# Patient Record
Sex: MALE | Employment: OTHER | URBAN - METROPOLITAN AREA
[De-identification: names, ages, dates, MRNs, and addresses within clinical notes are randomized per-mention and may not be internally consistent; named-entity substitution may affect disease eponyms.]

---

## 2018-02-03 ENCOUNTER — HOSPITAL ENCOUNTER (EMERGENCY)
Age: 83
Discharge: HOME | End: 2018-02-03
Attending: EMERGENCY MEDICINE
Payer: MEDICARE

## 2018-02-03 VITALS
RESPIRATION RATE: 16 BRPM | DIASTOLIC BLOOD PRESSURE: 65 MMHG | HEIGHT: 70 IN | HEART RATE: 58 BPM | SYSTOLIC BLOOD PRESSURE: 143 MMHG | TEMPERATURE: 97.5 F | BODY MASS INDEX: 21.62 KG/M2 | WEIGHT: 151 LBS | OXYGEN SATURATION: 96 %

## 2018-02-03 DIAGNOSIS — R79.1 ELEVATED INR (INTERNATIONAL NORMALIZED RATIO): ICD-10-CM

## 2018-02-03 DIAGNOSIS — S81.819A SKIN TEAR OF LOWER LEG WITHOUT COMPLICATION, INITIAL ENCOUNTER: Primary | ICD-10-CM

## 2018-02-03 LAB
INR PPP: 3.8 (ref 0.9–1.1)
PROTHROMBIN TIME: 37.8 SEC (ref 9–11.1)

## 2018-02-03 PROCEDURE — 99283 EMERGENCY DEPT VISIT LOW MDM: CPT

## 2018-02-03 PROCEDURE — 36415 COLL VENOUS BLD VENIPUNCTURE: CPT | Performed by: NURSE PRACTITIONER

## 2018-02-03 PROCEDURE — 85610 PROTHROMBIN TIME: CPT | Performed by: NURSE PRACTITIONER

## 2018-02-03 RX ORDER — RANITIDINE 300 MG/1
300 TABLET ORAL DAILY
COMMUNITY
End: 2018-08-10

## 2018-02-03 RX ORDER — PYRIDOXINE HCL (VITAMIN B6) 100 MG
100 TABLET ORAL DAILY
COMMUNITY

## 2018-02-03 RX ORDER — FUROSEMIDE 40 MG/1
80 TABLET ORAL DAILY
COMMUNITY

## 2018-02-03 RX ORDER — MENTHOL
1000 GEL (GRAM) TOPICAL DAILY
COMMUNITY

## 2018-02-03 NOTE — DISCHARGE INSTRUCTIONS
Cuts Left Open: Care Instructions  Your Care Instructions    A cut can happen anywhere on your body. Sometimes a cut can injure the tendons, blood vessels, or nerves. A cut may be left open instead of being closed with stitches, staples, or adhesive. A cut may be left open when it is likely to become infected, because closing it can make infection even more likely. You will probably have a bandage. The doctor may want the cut to stay open the whole time it heals. This happens with some cuts when too much time has gone by since the cut happened. Or the doctor may tell you to come back to have the cut closed in 4 to 5 days, when there is less chance of infection. If the cut stays open while healing, your scar may be larger than if the cut was closed. But you can get treatment later to make the scar smaller. The doctor has checked you carefully, but problems can develop later. If you notice any problems or new symptoms, get medical treatment right away. Follow-up care is a key part of your treatment and safety. Be sure to make and go to all appointments, and call your doctor if you are having problems. It's also a good idea to know your test results and keep a list of the medicines you take. How can you care for yourself at home? · Keep the cut dry for the first 24 to 48 hours. After this, you can shower if your doctor okays it. Pat the cut dry. · Don't soak the cut, such as in a bathtub. Your doctor will tell you when it's safe to get the cut wet. · If your doctor told you how to care for your cut, follow your doctor's instructions. If you did not get instructions, follow this general advice:  ¨ After the first 24 to 48 hours, wash the cut with clean water 2 times a day. Don't use hydrogen peroxide or alcohol, which can slow healing. ¨ You may cover the cut with a thin layer of petroleum jelly, such as Vaseline, and a nonstick bandage.   ¨ Apply more petroleum jelly and replace the bandage as needed. · Prop up the injured area on a pillow anytime you sit or lie down during the next 3 days. Try to keep it above the level of your heart. This will help reduce swelling. · Avoid any activity that could cause your cut to get worse. · Take pain medicines exactly as directed. ¨ If the doctor gave you a prescription medicine for pain, take it as prescribed. ¨ If you are not taking a prescription pain medicine, ask your doctor if you can take an over-the-counter medicine. When should you call for help? Call your doctor now or seek immediate medical care if:  ? · You have new pain, or your pain gets worse. ? · The cut starts to bleed, and blood soaks through the bandage. Oozing small amounts of blood is normal.   ? · The skin near the cut is cold or pale or changes color. ? · You have tingling, weakness, or numbness near the cut.   ? · You have trouble moving the area near the cut.   ? · You have symptoms of infection, such as:  ¨ Increased pain, swelling, warmth, or redness around the cut. ¨ Red streaks leading from the cut. ¨ Pus draining from the cut. ¨ A fever. ? Watch closely for changes in your health, and be sure to contact your doctor if:  ? · The cut is not closing (getting smaller). ? · You do not get better as expected. Where can you learn more? Go to http://gwyn-rich.info/. Enter [id number] in the search box to learn more about \"Cuts Left Open: Care Instructions. \"  Current as of: March 20, 2017  Content Version: 11.4  © 6688-4421 [employer]. Care instructions adapted under license by [employer] (which disclaims liability or warranty for this information). If you have questions about a medical condition or this instruction, always ask your healthcare professional. [place] [address] any warranty or liability for your use of this information.

## 2018-02-03 NOTE — ED TRIAGE NOTES
Patient ambulated to ED treatment area with personal cane for complaint if \"my right leg started to bleed last night. It stopped and I went to bed. When I got out of the shower this morning another place started to bleed. \" Patient denies injury to the area.

## 2018-02-03 NOTE — ED PROVIDER NOTES
HPI Comments: The patient is a 80 y.o. male with past medical history significant for Hairy Cell Leukemia, colon cancer, aortic valve murmur, iron deficiency anemia and GERD who presents from home with chief complaint of bleeding on his right leg. Patient states that he had a spot on his leg yesterday that opened up. He placed a dressing over the area. He did use an adhesive bandage to the skin. This morning when he got out of the shower he noticed his leg was bleeding in 2 additional areas. All atraumatic. He was recently seen at his oncologist and states his platelets were normal. He forgot to bring his paperwork with him. His INR has been running near 3.0 over the last moth. He is due to have that checked in the next few days. Patient denies: fevers, chills, nausea, vomiting, diarrhea, constipation, loss of bowel or bladder, chest pain, shortness of breath or dyspnea on exertion. Nothing makes the symptoms better or worse. There are no other acute medical concerns at this time. PCP: Flavio Arreguin MD      The history is provided by the patient. No  was used. Past Medical History:   Diagnosis Date    Arrhythmia     A-Fib.  Arthritis     Cancer ([address]     luekemia- just completed 8 days of chemotherapy    [facility] [address] [address]     Hairy cell leukemia    Cancer ([address]     colon    GERD (gastroesophageal reflux disease)     Murmur     Other ill-defined conditions(799.89)     De Leon's esophagus    Unspecified sleep apnea     doesn't use c-pap.        Past Surgical History:   Procedure Laterality Date    ABDOMEN SURGERY PROC UNLISTED      colon resection    COLONOSCOPY N/A 6/22/2016    COLONOSCOPY performed by Cabrera Loera MD at [facility]    HX ORTHOPAEDIC  2002    broken right leg -repair     HX OTHER SURGICAL      hemmrhoidectomy    HX TONSILLECTOMY           Family History:   Problem Relation Age of Onset    Cancer Mother     Cancer Brother        Social History     Social History    Marital status:      Spouse name: N/A    Number of children: N/A    Years of education: N/A     Occupational History    Not on file. Social History Main Topics    Smoking status: Former Smoker     Packs/day: 2.00     Quit date: 11/8/1980    Smokeless tobacco: Never Used    Alcohol use 0.5 oz/week     1 Glasses of wine per week      Comment: glass with dinner nightly    Drug use: No    Sexual activity: Not on file     Other Topics Concern    Not on file     Social History Narrative         ALLERGIES: Review of patient's allergies indicates no known allergies. Review of Systems   Constitutional: Negative for appetite change, chills and fever. HENT: Negative. Respiratory: Negative for cough, shortness of breath and wheezing. Cardiovascular: Negative for chest pain. Gastrointestinal: Negative for abdominal pain, constipation, diarrhea, nausea and vomiting. Genitourinary: Negative for dysuria and urgency. Musculoskeletal: Negative for back pain. Skin: Positive for wound. Negative for color change and rash. Neurological: Negative for dizziness and headaches. Psychiatric/Behavioral: Negative. All other systems reviewed and are negative. Vitals:    02/03/18 1133   BP: 165/79   Pulse: 86   Resp: 17   Temp: 97.5 °F (36.4 °C)   SpO2: 100%   Weight: 151 kg (332 lb 14.3 oz)   Height: 5' 10\" (1.778 m)            Physical Exam   Constitutional: He is oriented to person, place, and time. He appears well-developed and well-nourished. HENT:   Head: Normocephalic and atraumatic. Neck: Normal range of motion. Neck supple. Cardiovascular: Normal rate, regular rhythm and intact distal pulses. Murmur heard. Systolic murmur is present with a grade of 5/6   Pulses:       Radial pulses are 2+ on the right side, and 2+ on the left side. Dorsalis pedis pulses are 2+ on the right side, and 2+ on the left side. Pulmonary/Chest: Effort normal and breath sounds normal. No respiratory distress. He has no decreased breath sounds. He has no wheezes. He has no rales. He exhibits no tenderness. Abdominal: Soft. Bowel sounds are normal. There is no tenderness. There is no guarding. Musculoskeletal: Normal range of motion. Neurological: He is alert and oriented to person, place, and time. Skin: Skin is warm and dry. Laceration and petechiae noted. No rash noted. No erythema. Psychiatric: He has a normal mood and affect. His behavior is normal. Judgment and thought content normal.   Nursing note and vitals reviewed. MDM  Number of Diagnoses or Management Options        ED Course     Assessment & Plan:   PT/INR    Consult to [facility]. Dr. Chidi Cunningham office for lab verification    Discussed with DO Dominick Buitrago NP  02/03/18  12:00 PM    Procedures    Spoke to the on-call RN at [facility]. She confirmed his labs as: WBC: 6.1; Hgb: 10.3; PLT: 223. They do not have an INR on record. Will dress the wound on his leg with adaptic non-adherent dressing, 4x4's and ACE wrap. Dominick Sellers NP  02/03/18  12:07 PM    INR is 3.8. Advised patient to contact the physician that adjusts his coumadin doses today for assistance in bringing down his INR to an appropriate level. 12:56 PM  The patient has been reevaluated. The patient is ready for discharge. The patient's signs, symptoms, diagnosis, and discharge instructions have been discussed and the patient/ family has conveyed their understanding. The patient is to follow up as recommended or return to the ED should their symptoms worsen. Plan has been discussed and the patient is in agreement.     LABORATORY TESTS:  Recent Results (from the past 12 hour(s))   PROTHROMBIN TIME + INR    Collection Time: 02/03/18 12:07 PM   Result Value Ref Range    INR 3.8 (H) 0.9 - 1.1      Prothrombin time 37.8 (H) 9.0 - 11.1 sec       IMAGING RESULTS:  No orders to display     No results found. MEDICATIONS GIVEN:  Medications - No data to display    IMPRESSION:  1. Skin tear of lower leg without complication, initial encounter    2. Elevated INR (international normalized ratio)        PLAN:  1. Discharge Medication List as of 2/3/2018 12:53 PM        2. Follow-up Information     Follow up With Details Comments Contact Info    Nik López MD  for INR re-check [place] [address]  [phone]      [address] [place], [place] [address]T  As needed, If symptoms worsen [address] [place] [address]  [phone]        3.  Discussed return precautions    Return to ED for new or worsening symptoms       Rashmi Fitzgerald NP

## 2018-02-03 NOTE — ED NOTES
Patient  discharged with instructions per NP [facility] Selena BABCOCK. Instructions reviewed with pt.

## 2018-08-10 ENCOUNTER — HOSPITAL ENCOUNTER (EMERGENCY)
Age: 83
Discharge: HOME | End: 2018-08-10
Attending: EMERGENCY MEDICINE | Admitting: EMERGENCY MEDICINE
Payer: MEDICARE

## 2018-08-10 VITALS
HEART RATE: 67 BPM | RESPIRATION RATE: 16 BRPM | OXYGEN SATURATION: 95 % | WEIGHT: 150 LBS | BODY MASS INDEX: 21.52 KG/M2 | SYSTOLIC BLOOD PRESSURE: 144 MMHG | DIASTOLIC BLOOD PRESSURE: 83 MMHG

## 2018-08-10 DIAGNOSIS — Z79.01 ANTICOAGULANT LONG-TERM USE: ICD-10-CM

## 2018-08-10 DIAGNOSIS — S51.812A LACERATION OF LEFT FOREARM, INITIAL ENCOUNTER: Primary | ICD-10-CM

## 2018-08-10 PROCEDURE — 77030039266 HC ADH SKN EXOFIN S2SG -A

## 2018-08-10 PROCEDURE — 77030018390 HC SPNG HEMSTAT2 J&J -B

## 2018-08-10 PROCEDURE — 99282 EMERGENCY DEPT VISIT SF MDM: CPT

## 2018-08-10 PROCEDURE — 77030018836 HC SOL IRR NACL ICUM -A

## 2018-08-10 PROCEDURE — 75810000293 HC SIMP/SUPERF WND  RPR

## 2018-08-10 RX ORDER — OMEPRAZOLE 20 MG/1
20 CAPSULE, DELAYED RELEASE ORAL DAILY
COMMUNITY
End: 2018-10-14

## 2018-08-10 NOTE — DISCHARGE INSTRUCTIONS
Cuts Closed With Adhesives: Care Instructions  Your Care Instructions  A cut can happen anywhere on your body. The doctor used an adhesive to close the cut. When the adhesive dries, it forms a film that holds the edges of the cut together. Skin adhesives are sometimes called liquid stitches. If the cut went deep and through the skin, the doctor may have put in a layer of stitches below the adhesive. The deeper layer of stitches brings the deep part of the cut together. These stitches will dissolve and don't need to be removed. You don't see the stitches, only the adhesive. You may have a bandage. The doctor has checked you carefully, but problems can develop later. If you notice any problems or new symptoms, get medical treatment right away. Follow-up care is a key part of your treatment and safety. Be sure to make and go to all appointments, and call your doctor if you are having problems. It's also a good idea to know your test results and keep a list of the medicines you take. How can you care for yourself at home? · Keep the cut dry for the first 24 to 48 hours. After this, you can shower if your doctor okays it. Pat the cut dry. · Don't soak the cut, such as in a bathtub. Your doctor will tell you when it's safe to get the cut wet. · If your doctor told you how to care for your cut, follow your doctor's instructions. If you did not get instructions, follow this general advice:  ¨ Do not put any kind of ointment, cream, or lotion over the area. This can make the adhesive fall off too soon. ¨ After the first 24 to 48 hours, wash around the cut with clean water 2 times a day. Do not use hydrogen peroxide or alcohol, which can slow healing. ¨ If the doctor told you to use a bandage, put on a new bandage after cleaning the cut or if the bandage gets wet or dirty. · Prop up the sore area on a pillow anytime you sit or lie down during the next 3 days. Try to keep it above the level of your heart. This will help reduce swelling. · Leave the skin adhesive on your skin until it falls off on its own. This may take 5 to 10 days. · Do not scratch, rub, or pick at the adhesive. · Do not put the sticky part of a bandage directly on the adhesive. · Avoid any activity that could cause your cut to reopen. · Be safe with medicines. Read and follow all instructions on the label. ¨ If the doctor gave you a prescription medicine for pain, take it as prescribed. ¨ If you are not taking a prescription pain medicine, ask your doctor if you can take an over-the-counter medicine. When should you call for help? Call your doctor now or seek immediate medical care if:    · You have new pain, or your pain gets worse.     · The skin near the cut is cold or pale or changes color.     · You have tingling, weakness, or numbness near the cut.     · The cut starts to bleed.     · You have trouble moving the area near the cut.     · You have symptoms of infection, such as:  ¨ Increased pain, swelling, warmth, or redness around the cut. ¨ Red streaks leading from the cut. ¨ Pus draining from the cut. ¨ A fever.    Watch closely for changes in your health, and be sure to contact your doctor if:    · The cut reopens.     · You do not get better as expected. Where can you learn more? Go to http://gwyn-rich.info/. Enter P174 in the search box to learn more about \"Cuts Closed With Adhesives: Care Instructions. \"  Current as of: November 20, 2017  Content Version: 11.7  © 9120-8450 [employer]. Care instructions adapted under license by [employer] (which disclaims liability or warranty for this information). If you have questions about a medical condition or this instruction, always ask your healthcare professional. [address] any warranty or liability for your use of this information.          Cuts: Care Instructions  Your Care Instructions  A cut can happen anywhere on your body. Stitches, staples, skin adhesives, or pieces of tape called Steri-Strips are sometimes used to keep the edges of a cut together and help it heal. Steri-Strips can be used by themselves or with stitches or staples. Sometimes cuts are left open. If the cut went deep and through the skin, the doctor may have closed the cut in two layers. A deeper layer of stitches brings the deep part of the cut together. These stitches will dissolve and don't need to be removed. The upper layer closure, which could be stitches, staples, Steri-Strips, or adhesive, is what you see on the cut. A cut is often covered by a bandage. The doctor has checked you carefully, but problems can develop later. If you notice any problems or new symptoms, get medical treatment right away. Follow-up care is a key part of your treatment and safety. Be sure to make and go to all appointments, and call your doctor if you are having problems. It's also a good idea to know your test results and keep a list of the medicines you take. How can you care for yourself at home? If a cut is open or closed  · Prop up the sore area on a pillow anytime you sit or lie down during the next 3 days. Try to keep it above the level of your heart. This will help reduce swelling. · Keep the cut dry for the first 24 to 48 hours. After this, you can shower if your doctor okays it. Pat the cut dry. · Don't soak the cut, such as in a bathtub. Your doctor will tell you when it's safe to get the cut wet. · After the first 24 to 48 hours, clean the cut with soap and water 2 times a day unless your doctor gives you different instructions. ¨ Don't use hydrogen peroxide or alcohol, which can slow healing. ¨ You may cover the cut with a thin layer of petroleum jelly and a nonstick bandage. ¨ If the doctor put a bandage over the cut, put on a new bandage after cleaning the cut or if the bandage gets wet or dirty.   · Avoid any activity that could cause your cut to reopen. · Be safe with medicines. Read and follow all instructions on the label. ¨ If the doctor gave you a prescription medicine for pain, take it as prescribed. ¨ If you are not taking a prescription pain medicine, ask your doctor if you can take an over-the-counter medicine. If the cut is closed with stitches, staples, or Steri-Strips  · Follow the above instructions for open or closed cuts. · Do not remove the stitches or staples on your own. Your doctor will tell you when to come back to have the stitches or staples removed. · Leave Steri-Strips on until they fall off. If the cut is closed with a skin adhesive  · Follow the above instructions for open or closed cuts. · Leave the skin adhesive on your skin until it falls off on its own. This may take 5 to 10 days. · Do not scratch, rub, or pick at the adhesive. · Do not put the sticky part of a bandage directly on the adhesive. · Do not put any kind of ointment, cream, or lotion over the area. This can make the adhesive fall off too soon. Do not use hydrogen peroxide or alcohol, which can slow healing. When should you call for help? Call your doctor now or seek immediate medical care if:    · You have new pain, or your pain gets worse.     · The skin near the cut is cold or pale or changes color.     · You have tingling, weakness, or numbness near the cut.     · The cut starts to bleed, and blood soaks through the bandage. Oozing small amounts of blood is normal.     · You have trouble moving the area near the cut.     · You have symptoms of infection, such as:  ¨ Increased pain, swelling, warmth, or redness around the cut. ¨ Red streaks leading from the cut. ¨ Pus draining from the cut. ¨ A fever.    Watch closely for changes in your health, and be sure to contact your doctor if:    · The cut reopens.     · You do not get better as expected. Where can you learn more? Go to http://gwyn-rich.info/.   Enter Z730 in the search box to learn more about \"Cuts: Care Instructions. \"  Current as of: November 20, 2017  Content Version: 11.7  © 4534-5308 [employer]. Care instructions adapted under license by [employer] (which disclaims liability or warranty for this information). If you have questions about a medical condition or this instruction, always ask your healthcare professional. [address] any warranty or liability for your use of this information.

## 2018-08-10 NOTE — ED PROVIDER NOTES
HPI Comments: 59-year-old white male presents to the emergency department with a skin tear. The patient states that he has very thin skin. He is also on Coumadin for atrial fibrillation. He states that 5 days ago he scraped his left forearm on a door. He has a 2 cm laceration to the dorsum of the left forearm. Patient states that the area initially stopped bleeding after about a day. He then states over the last 24 hours the area has been oozing a little bit of blood. Patient had his INR checked 3 days ago and it was 2.5 which is normal. Patient denies pain in his left arm. He denies fevers. No other injuries. Patient denies dizziness, chest pain or shortness of breath. The history is provided by the patient. Past Medical History:   Diagnosis Date    Arrhythmia     A-Fib. 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 Past Surgical History:   Procedure Laterality Date    ABDOMEN SURGERY PROC UNLISTED      colon resection    COLONOSCOPY N/A 6/22/2016    COLONOSCOPY performed by Rachell Grace MD at [address] HX ORTHOPAEDIC  2002    broken right leg -repair     HX OTHER SURGICAL      hemmrhoidectomy    HX TONSILLECTOMY           Family History:   Problem Relation Age of Onset    Cancer Mother     Cancer Brother        Social History     Social History    Marital status:      Spouse name: N/A    Number of children: N/A    Years of education: N/A     Occupational History    Not on file.      Social History Main Topics    Smoking status: Former Smoker     Packs/day: 2.00     Quit date: 11/8/1980    Smokeless tobacco: Never Used    Alcohol use 0.5 oz/week     1 Glasses of wine per week      Comment: glass with dinner nightly    Drug use: No    Sexual activity: Not on file     Other Topics Concern    Not on file     Social History Narrative         ALLERGIES: Review of patient's allergies indicates no known allergies. Review of Systems   Constitutional: Negative for fever. HENT: Negative for congestion. Eyes: Negative for pain. Respiratory: Negative for shortness of breath. Cardiovascular: Negative for chest pain. Gastrointestinal: Negative for abdominal pain. Endocrine: Negative for polyuria. Genitourinary: Negative for difficulty urinating. Musculoskeletal: Negative for neck pain. Skin: Positive for wound. Allergic/Immunologic: Negative for immunocompromised state. Neurological: Negative for headaches. Hematological: Does not bruise/bleed easily. Psychiatric/Behavioral: Negative for confusion. All other systems reviewed and are negative. There were no vitals filed for this visit. Physical Exam   Constitutional: He is oriented to person, place, and time. He appears well-developed and well-nourished. No distress. HENT:   Head: Normocephalic and atraumatic. Right Ear: External ear normal.   Left Ear: External ear normal.   Eyes: EOM are normal. Pupils are equal, round, and reactive to light. Neck: Normal range of motion. Neck supple. No JVD present. No tracheal deviation present. Cardiovascular: Normal rate, regular rhythm and normal heart sounds. Exam reveals no gallop and no friction rub. No murmur heard. Pulmonary/Chest: Effort normal and breath sounds normal. No stridor. No respiratory distress. He has no wheezes. He has no rales. Abdominal: Soft. Bowel sounds are normal. He exhibits no distension. There is no tenderness. There is no rebound and no guarding. Musculoskeletal: Normal range of motion. He exhibits no edema, tenderness or deformity. Neurological: He is alert and oriented to person, place, and time.  He has normal reflexes. No cranial nerve deficit. Coordination normal.   Skin: Skin is warm and dry. No rash noted. He is not diaphoretic. No erythema. 2 cm linear superficial laceration to left forearm. No real bleeding from skin tear/laceration. Psychiatric: He has a normal mood and affect. His behavior is normal. Judgment and thought content normal.   Nursing note and vitals reviewed. MDM  Number of Diagnoses or Management Options  Diagnosis management comments: Patient has a 2 cm skin tear to the left forearm. We'll apply Dermabond. Will apply a good dressing. Patient declines having his INR rechecked as he says he just had it checked several days ago. Amount and/or Complexity of Data Reviewed  Decide to obtain previous medical records or to obtain history from someone other than the patient: yes  Review and summarize past medical records: yes  Independent visualization of images, tracings, or specimens: yes          ED Course       Wound Closure by Adhesive  Date/Time: 8/10/2018 11:15 AM  Performed by: Travis Stallings  Authorized by: Clari CASTANEDA     Consent:     Consent obtained:  Verbal    Consent given by:  Patient    Risks discussed:  Infection, pain and retained foreign body    Alternatives discussed:  No treatment  Anesthesia (see MAR for exact dosages):      Anesthesia method:  None  Laceration details:     Location:  Shoulder/arm    Shoulder/arm location:  L lower arm    Length (cm):  2  Repair type:     Repair type:  Simple  Pre-procedure details:     Preparation:  Patient was prepped and draped in usual sterile fashion  Treatment:     Area cleansed with:  Saline    Amount of cleaning:  Standard    Irrigation solution:  Sterile water    Irrigation method:  Syringe    Visualized foreign bodies/material removed: no    Skin repair:     Repair method:  Tissue adhesive  Approximation:     Approximation:  Close    Vermilion border: well-aligned    Post-procedure details:     Dressing:  Non-adherent dressing    Patient tolerance of procedure: Tolerated well, no immediate complications      Dressing placed over Dermabond w/ good relief    No active bleeding now after dressing has been placed    Good return precautions given to patient. Close follow up with PCP recommended. Patient and/or family voices understanding of this plan. Discharge instructions were explained by me and all concerns were addressed.

## 2018-08-10 NOTE — ED TRIAGE NOTES
Pt ambulated to the treatment area with a steady gait using his cane. Pt states \"I bumped my left arm on a door about a week ago I have a skin tare I feel it needs to be looked at. \"

## 2018-08-10 NOTE — ED NOTES
Pt was discharged and given instructions by Dr Tiffanie Means . Pt verbalized good understanding of all discharge instructions, and F/U care. All questions answered. Pt in stable condition on discharge.

## 2018-10-14 ENCOUNTER — APPOINTMENT (OUTPATIENT)
Dept: CT IMAGING | Age: 83
End: 2018-10-14
Attending: EMERGENCY MEDICINE
Payer: MEDICARE

## 2018-10-14 ENCOUNTER — APPOINTMENT (OUTPATIENT)
Dept: GENERAL RADIOLOGY | Age: 83
End: 2018-10-14
Attending: EMERGENCY MEDICINE
Payer: MEDICARE

## 2018-10-14 ENCOUNTER — APPOINTMENT (OUTPATIENT)
Dept: MRI IMAGING | Age: 83
End: 2018-10-14
Attending: INTERNAL MEDICINE
Payer: MEDICARE

## 2018-10-14 ENCOUNTER — HOSPITAL ENCOUNTER (OUTPATIENT)
Age: 83
Setting detail: OBSERVATION
Discharge: HOME | End: 2018-10-15
Attending: EMERGENCY MEDICINE | Admitting: INTERNAL MEDICINE
Payer: MEDICARE

## 2018-10-14 DIAGNOSIS — Z79.01 CHRONIC ANTICOAGULATION: ICD-10-CM

## 2018-10-14 DIAGNOSIS — G45.9 TIA (TRANSIENT ISCHEMIC ATTACK): Primary | ICD-10-CM

## 2018-10-14 DIAGNOSIS — I48.20 CHRONIC ATRIAL FIBRILLATION (HCC): Chronic | ICD-10-CM

## 2018-10-14 PROBLEM — R29.898 HAND WEAKNESS: Status: ACTIVE | Noted: 2018-10-14

## 2018-10-14 PROBLEM — R93.89 ABNORMAL CHEST X-RAY: Status: ACTIVE | Noted: 2018-10-14

## 2018-10-14 PROBLEM — N18.30 CKD (CHRONIC KIDNEY DISEASE) STAGE 3, GFR 30-59 ML/MIN (HCC): Chronic | Status: ACTIVE | Noted: 2018-10-14

## 2018-10-14 PROBLEM — R77.8 ELEVATED TROPONIN: Status: ACTIVE | Noted: 2018-10-14

## 2018-10-14 PROBLEM — G47.33 OSA (OBSTRUCTIVE SLEEP APNEA): Chronic | Status: ACTIVE | Noted: 2018-10-14

## 2018-10-14 LAB
ALBUMIN SERPL-MCNC: 3 G/DL (ref 3.5–5)
ALBUMIN/GLOB SERPL: 0.8 {RATIO} (ref 1.1–2.2)
ALP SERPL-CCNC: 175 U/L (ref 45–117)
ALT SERPL-CCNC: 25 U/L (ref 12–78)
ANION GAP SERPL CALC-SCNC: 5 MMOL/L (ref 5–15)
APTT PPP: 32.8 SEC (ref 22.1–32)
AST SERPL-CCNC: 24 U/L (ref 15–37)
BASOPHILS # BLD: 0.1 K/UL (ref 0–0.1)
BASOPHILS NFR BLD: 1 % (ref 0–1)
BILIRUB SERPL-MCNC: 0.8 MG/DL (ref 0.2–1)
BNP SERPL-MCNC: ABNORMAL PG/ML (ref 0–450)
BUN SERPL-MCNC: 29 MG/DL (ref 6–20)
BUN/CREAT SERPL: 16 (ref 12–20)
CALCIUM SERPL-MCNC: 8.8 MG/DL (ref 8.5–10.1)
CHLORIDE SERPL-SCNC: 104 MMOL/L (ref 97–108)
CO2 SERPL-SCNC: 32 MMOL/L (ref 21–32)
CREAT SERPL-MCNC: 1.79 MG/DL (ref 0.7–1.3)
DIFFERENTIAL METHOD BLD: ABNORMAL
EOSINOPHIL # BLD: 0.5 K/UL (ref 0–0.4)
EOSINOPHIL NFR BLD: 7 % (ref 0–7)
ERYTHROCYTE [DISTWIDTH] IN BLOOD BY AUTOMATED COUNT: 17 % (ref 11.5–14.5)
GLOBULIN SER CALC-MCNC: 3.8 G/DL (ref 2–4)
GLUCOSE SERPL-MCNC: 84 MG/DL (ref 65–100)
HCT VFR BLD AUTO: 33.1 % (ref 36.6–50.3)
HGB BLD-MCNC: 10.4 G/DL (ref 12.1–17)
IMM GRANULOCYTES # BLD: 0.1 K/UL (ref 0–0.04)
IMM GRANULOCYTES NFR BLD AUTO: 1 % (ref 0–0.5)
INR PPP: 1.3 (ref 0.9–1.1)
LYMPHOCYTES # BLD: 1.9 K/UL (ref 0.8–3.5)
LYMPHOCYTES NFR BLD: 25 % (ref 12–49)
MCH RBC QN AUTO: 33 PG (ref 26–34)
MCHC RBC AUTO-ENTMCNC: 31.4 G/DL (ref 30–36.5)
MCV RBC AUTO: 105.1 FL (ref 80–99)
MONOCYTES # BLD: 1.1 K/UL (ref 0–1)
MONOCYTES NFR BLD: 15 % (ref 5–13)
NEUTS SEG # BLD: 3.7 K/UL (ref 1.8–8)
NEUTS SEG NFR BLD: 51 % (ref 32–75)
NRBC # BLD: 0.18 K/UL (ref 0–0.01)
NRBC BLD-RTO: 2.4 PER 100 WBC
PLATELET # BLD AUTO: 297 K/UL (ref 150–400)
PMV BLD AUTO: 9.4 FL (ref 8.9–12.9)
POTASSIUM SERPL-SCNC: 4 MMOL/L (ref 3.5–5.1)
PROT SERPL-MCNC: 6.8 G/DL (ref 6.4–8.2)
PROTHROMBIN TIME: 13.4 SEC (ref 9–11.1)
RBC # BLD AUTO: 3.15 M/UL (ref 4.1–5.7)
RBC MORPH BLD: ABNORMAL
SODIUM SERPL-SCNC: 141 MMOL/L (ref 136–145)
THERAPEUTIC RANGE,PTTT: ABNORMAL SECS (ref 58–77)
TROPONIN I SERPL-MCNC: 0.09 NG/ML
TROPONIN I SERPL-MCNC: 0.09 NG/ML
WBC # BLD AUTO: 7.4 K/UL (ref 4.1–11.1)

## 2018-10-14 PROCEDURE — 80053 COMPREHEN METABOLIC PANEL: CPT | Performed by: EMERGENCY MEDICINE

## 2018-10-14 PROCEDURE — 85025 COMPLETE CBC W/AUTO DIFF WBC: CPT | Performed by: EMERGENCY MEDICINE

## 2018-10-14 PROCEDURE — 83880 ASSAY OF NATRIURETIC PEPTIDE: CPT | Performed by: EMERGENCY MEDICINE

## 2018-10-14 PROCEDURE — 85610 PROTHROMBIN TIME: CPT | Performed by: EMERGENCY MEDICINE

## 2018-10-14 PROCEDURE — 74011250636 HC RX REV CODE- 250/636: Performed by: INTERNAL MEDICINE

## 2018-10-14 PROCEDURE — 71045 X-RAY EXAM CHEST 1 VIEW: CPT

## 2018-10-14 PROCEDURE — 70450 CT HEAD/BRAIN W/O DYE: CPT

## 2018-10-14 PROCEDURE — 74011250637 HC RX REV CODE- 250/637: Performed by: INTERNAL MEDICINE

## 2018-10-14 PROCEDURE — 85730 THROMBOPLASTIN TIME PARTIAL: CPT | Performed by: EMERGENCY MEDICINE

## 2018-10-14 PROCEDURE — 65660000000 HC RM CCU STEPDOWN

## 2018-10-14 PROCEDURE — 96374 THER/PROPH/DIAG INJ IV PUSH: CPT

## 2018-10-14 PROCEDURE — 77030032490 HC SLV COMPR SCD KNE COVD -B

## 2018-10-14 PROCEDURE — 65390000012 HC CONDITION CODE 44 OBSERVATION

## 2018-10-14 PROCEDURE — 84484 ASSAY OF TROPONIN QUANT: CPT | Performed by: EMERGENCY MEDICINE

## 2018-10-14 PROCEDURE — 96376 TX/PRO/DX INJ SAME DRUG ADON: CPT

## 2018-10-14 PROCEDURE — 93005 ELECTROCARDIOGRAM TRACING: CPT

## 2018-10-14 PROCEDURE — 36415 COLL VENOUS BLD VENIPUNCTURE: CPT | Performed by: INTERNAL MEDICINE

## 2018-10-14 PROCEDURE — 99285 EMERGENCY DEPT VISIT HI MDM: CPT

## 2018-10-14 RX ORDER — SODIUM CHLORIDE 9 MG/ML
75 INJECTION, SOLUTION INTRAVENOUS CONTINUOUS
Status: CANCELLED | OUTPATIENT
Start: 2018-10-14 | End: 2018-10-15

## 2018-10-14 RX ORDER — ENOXAPARIN SODIUM 100 MG/ML
1 INJECTION SUBCUTANEOUS EVERY 12 HOURS
Status: DISCONTINUED | OUTPATIENT
Start: 2018-10-14 | End: 2018-10-14

## 2018-10-14 RX ORDER — OXYCODONE AND ACETAMINOPHEN 5; 325 MG/1; MG/1
1 TABLET ORAL
Status: DISCONTINUED | OUTPATIENT
Start: 2018-10-14 | End: 2018-10-15 | Stop reason: HOSPADM

## 2018-10-14 RX ORDER — ZOLPIDEM TARTRATE 5 MG/1
5 TABLET ORAL
Status: DISCONTINUED | OUTPATIENT
Start: 2018-10-14 | End: 2018-10-15 | Stop reason: HOSPADM

## 2018-10-14 RX ORDER — SODIUM CHLORIDE 0.9 % (FLUSH) 0.9 %
5-10 SYRINGE (ML) INJECTION EVERY 8 HOURS
Status: DISCONTINUED | OUTPATIENT
Start: 2018-10-14 | End: 2018-10-15 | Stop reason: HOSPADM

## 2018-10-14 RX ORDER — PROCHLORPERAZINE EDISYLATE 5 MG/ML
10 INJECTION INTRAMUSCULAR; INTRAVENOUS
Status: DISCONTINUED | OUTPATIENT
Start: 2018-10-14 | End: 2018-10-15 | Stop reason: HOSPADM

## 2018-10-14 RX ORDER — LOPERAMIDE HYDROCHLORIDE 2 MG/1
2 CAPSULE ORAL DAILY
COMMUNITY

## 2018-10-14 RX ORDER — ACETAMINOPHEN 325 MG/1
650 TABLET ORAL
Status: DISCONTINUED | OUTPATIENT
Start: 2018-10-14 | End: 2018-10-15 | Stop reason: HOSPADM

## 2018-10-14 RX ORDER — MONTELUKAST SODIUM 10 MG/1
10 TABLET ORAL
Status: DISCONTINUED | OUTPATIENT
Start: 2018-10-14 | End: 2018-10-15 | Stop reason: HOSPADM

## 2018-10-14 RX ORDER — FAMOTIDINE 20 MG/1
20 TABLET, FILM COATED ORAL DAILY
Status: DISCONTINUED | OUTPATIENT
Start: 2018-10-15 | End: 2018-10-15 | Stop reason: HOSPADM

## 2018-10-14 RX ORDER — ENOXAPARIN SODIUM 100 MG/ML
40 INJECTION SUBCUTANEOUS EVERY 24 HOURS
Status: CANCELLED | OUTPATIENT
Start: 2018-10-14

## 2018-10-14 RX ORDER — LABETALOL HYDROCHLORIDE 5 MG/ML
10 INJECTION, SOLUTION INTRAVENOUS
Status: DISCONTINUED | OUTPATIENT
Start: 2018-10-14 | End: 2018-10-15 | Stop reason: HOSPADM

## 2018-10-14 RX ORDER — LOPERAMIDE HYDROCHLORIDE 2 MG/1
2 CAPSULE ORAL DAILY
Status: DISCONTINUED | OUTPATIENT
Start: 2018-10-15 | End: 2018-10-15 | Stop reason: HOSPADM

## 2018-10-14 RX ORDER — LANOLIN ALCOHOL/MO/W.PET/CERES
325 CREAM (GRAM) TOPICAL 2 TIMES DAILY
COMMUNITY

## 2018-10-14 RX ORDER — ENOXAPARIN SODIUM 100 MG/ML
60 INJECTION SUBCUTANEOUS EVERY 24 HOURS
Status: DISCONTINUED | OUTPATIENT
Start: 2018-10-14 | End: 2018-10-15 | Stop reason: HOSPADM

## 2018-10-14 RX ORDER — CHOLESTYRAMINE 4 G/9G
1 POWDER, FOR SUSPENSION ORAL
COMMUNITY

## 2018-10-14 RX ORDER — RANITIDINE 150 MG/1
150 TABLET, FILM COATED ORAL 2 TIMES DAILY
COMMUNITY

## 2018-10-14 RX ORDER — CHOLESTYRAMINE 4 G/4.8G
1 POWDER, FOR SUSPENSION ORAL
Status: DISCONTINUED | OUTPATIENT
Start: 2018-10-14 | End: 2018-10-15 | Stop reason: HOSPADM

## 2018-10-14 RX ORDER — CHOLESTYRAMINE 4 G/4.8G
4 POWDER, FOR SUSPENSION ORAL 2 TIMES DAILY
COMMUNITY
End: 2018-10-14

## 2018-10-14 RX ORDER — HYDROMORPHONE HYDROCHLORIDE 2 MG/ML
0.5 INJECTION, SOLUTION INTRAMUSCULAR; INTRAVENOUS; SUBCUTANEOUS
Status: DISCONTINUED | OUTPATIENT
Start: 2018-10-14 | End: 2018-10-15 | Stop reason: HOSPADM

## 2018-10-14 RX ORDER — LANOLIN ALCOHOL/MO/W.PET/CERES
325 CREAM (GRAM) TOPICAL 2 TIMES DAILY
Status: DISCONTINUED | OUTPATIENT
Start: 2018-10-14 | End: 2018-10-15 | Stop reason: HOSPADM

## 2018-10-14 RX ORDER — SODIUM CHLORIDE 0.9 % (FLUSH) 0.9 %
5-10 SYRINGE (ML) INJECTION AS NEEDED
Status: DISCONTINUED | OUTPATIENT
Start: 2018-10-14 | End: 2018-10-15 | Stop reason: HOSPADM

## 2018-10-14 RX ADMIN — FERROUS SULFATE TAB 325 MG (65 MG ELEMENTAL FE) 325 MG: 325 (65 FE) TAB at 18:46

## 2018-10-14 RX ADMIN — Medication 10 ML: at 18:47

## 2018-10-14 RX ADMIN — WARFARIN SODIUM 2.5 MG: 2 TABLET ORAL at 17:52

## 2018-10-14 RX ADMIN — MONTELUKAST SODIUM 10 MG: 10 TABLET, COATED ORAL at 21:30

## 2018-10-14 RX ADMIN — ENOXAPARIN SODIUM 60 MG: 60 INJECTION SUBCUTANEOUS at 18:46

## 2018-10-14 RX ADMIN — Medication 10 ML: at 21:35

## 2018-10-14 RX ADMIN — CHOLESTYRAMINE 4 G: 4 POWDER, FOR SUSPENSION ORAL at 21:30

## 2018-10-14 NOTE — PROGRESS NOTES
Attempted MRI. Pt unable to lay flat for mri (89yrs old)(put pillows under bottom to tilt him back + apparatus to raise his head up--still couldnt lay head back) Pt refused when i offered to call nurse to get meds to help him relax. Informed RN when I returned him to floor.

## 2018-10-14 NOTE — PROGRESS NOTES
[facility] Dosing Services: 10/14/18 Consult for Warfarin Dosing by Pharmacy by Dr. Carlos Mcneal Consult provided for this 80 y.o.  male , for indication of Atrial Fibrillation. Day of Therapy: resuming from home, patient has been off warfarin since 10/6 due to suspected GI bleeding/blood in stool. Prior to holding therapy patient was on 7mg daily (5mg tab +2mg tab). Dose to achieve an INR goal of 2-3 Order entered for  Warfarin 2.5 (mg) ordered to be given today at 18:00. Will resume therapy cautiously due to history of bleed. Significant drug interactions: enoxaparin bridging Previous dose given PTA 10/5/18 7mg PT/INR Lab Results Component Value Date/Time INR 1.3 (H) 10/14/2018 02:37 PM  
  
Platelets Lab Results Component Value Date/Time PLATELET 604 32/50/6065 02:37 PM  
  
H/H Lab Results Component Value Date/Time HGB 10.4 (L) 10/14/2018 02:37 PM  
  
 
Pharmacy to follow daily and will provide subsequent Warfarin dosing based on clinical status. Werner Kaur)  Contact information [phone]

## 2018-10-14 NOTE — ED NOTES
Pt asked if he could use restroom, attempted to give urinal but pt refused. Pt ambulatory to restroom with cane at this time.

## 2018-10-14 NOTE — PROGRESS NOTES
Problem: Falls - Risk of 
Goal: *Absence of Falls Document Noe Azevedo Fall Risk and appropriate interventions in the flowsheet. Outcome: Progressing Towards Goal 
Fall Risk Interventions:

## 2018-10-14 NOTE — PROGRESS NOTES
Spiritual Care Assessment/Progress Note [address] 
 
 
NAME: Abi Mccarthy      MRN: [id number] AGE: 80 y.o. SEX: male Anabaptist Affiliation: Bahai  
Language: English  
 
10/14/2018     Total Time (in minutes): 5 Spiritual Assessment begun in [facility] EMERGENCY DEPT through conversation with: 
  
    []Patient        [] Family    [] Friend(s) Reason for Consult: Crisis, Emergency Department visit Spiritual beliefs: (Please include comment if needed) 
   [] Identifies with a amberly tradition:     
   [] Supported by a amberly community:        
   [] Claims no spiritual orientation:       
   [] Seeking spiritual identity:            
   [] Adheres to an individual form of spirituality:       
   [x] Not able to assess:                   
 
    
Identified resources for coping:  
   [] Prayer                           
   [] Music                  [] Guided Imagery 
   [] Family/friends                 [] Pet visits [] Devotional reading                         [] Unknown 
   [] Other:                                         
 
 
Interventions offered during this visit: (See comments for more details) Patient Interventions: Other (comment) Plan of Care: 
 
 [] Support spiritual and/or cultural needs  
 [] Support AMD and/or advance care planning process    
 [] Support grieving process 
 [] Coordinate Rites and/or Rituals  
 [] Coordination with community clergy [] No spiritual needs identified at this time 
 [] Detailed Plan of Care below (See Comments)  [] Make referral to Music Therapy 
[] Make referral to Pet Therapy    
[] Make referral to Addiction services 
[] Make referral to [facility] 
[] Make referral to Spiritual Care Partner 
[] No future visits requested       
[x] Follow up visits as needed Comments: [employer] is responding to Code S in ER 13. Pt is unavailable and heading to [place] at the time.  Spiritual care will continue to follow as able and as needed. Chaplain Yasir Kingston M.Div, M.S, [address] Spiritual Care available at [phone])

## 2018-10-14 NOTE — IP AVS SNAPSHOT
Haydee Steen 
 
 
 [address] [facility] 
[phone] Patient: Cherise Concepcion MRN: [id number] :10/6/1924 About your hospitalization You were admitted on:  2018 You last received care in the:  [facility] 3 PROG CARE TELE 1 You were discharged on:  October 15, 2018 Why you were hospitalized Your primary diagnosis was:  Hand Weakness Your diagnoses also included:  Colon Cancer (Hcc), Chronic Atrial Fibrillation (Hcc), Mathew (Obstructive Sleep Apnea), Chronic Anticoagulation, Elevated Troponin, Abnormal Chest X-Ray, Ckd (Chronic Kidney Disease) Stage 3, Gfr 30-59 Ml/Min (Hcc) Follow-up Information Follow up With Details Comments Contact Info Joanne Banerjee NP  call for follow up in stroke/TIA clinic with JAMEE Barillas. Bring your medicine bottles to your appointment [place] [address] [address] [facility] [address] 
[phone] Rafa Fitzpatrick MD Schedule an appointment as soon as possible for a visit on 10/16/2018 Hospital PCP f/u appointment on Tuesday 10/16 @ 11:30 a.m. For INR check. [address] [facility] [address] 1007 [facility] 
[phone] Helmut Severance, MD Schedule an appointment as soon as possible for a visit in 1 week for coumadin re evaluation [address] [facility] 1007 [facility] 
[phone] Your Scheduled Appointments Monday October 15, 2018  7:45 PM EDT  
MRA BRAIN WO CONT with [facility] MRI 1 [facility] MRI ([address]) [address] 100[address] [facility]  
[phone] 1. Please bring a list or a bag of your current medications to your appointment 2. Please be sure to remove ALL hair clips, pins, extensions, etc., prior to arriving for your MRI procedure. 3. If you have any medical implants or devices, please bring associated medical card with you.  4. Bring any non [facility] films or CDs pertaining to the area being imaged with you on the day of appointment. 5. A written order with a valid diagnosis and Physicians  signature is required for all scheduled tests. 6. Check in at registration 30min before your appointment time unless you were instructed to do otherwise. Park in designated visitor/patient parking. Enter through the main entrance, which is just to the left of the fountain. Once inside, go around the corner to the left. You will register in Outpatient Registration. Discharge Orders None A check boni indicates which time of day the medication should be taken. My Medications START taking these medications Instructions Each Dose to Equal  
 Morning Noon Evening Bedtime  
 warfarin 3 mg tablet Commonly known as:  COUMADIN Your last dose was: Your next dose is: Take 1 Tab by mouth daily for 7 doses. Indications: PREVENT THROMBOEMBOLISM IN CHRONIC ATRIAL FIBRILLATION  
 3 mg CONTINUE taking these medications Instructions Each Dose to Equal  
 Morning Noon Evening Bedtime  
 cholestyramine 4 gram packet Commonly known as:  Magdalene Altes Your last dose was: Your next dose is: Take 1 Packet by mouth nightly. 1 Packet  
    
   
   
   
  
 ferrous sulfate 325 mg (65 mg iron) tablet Your last dose was: Your next dose is: Take 325 mg by mouth two (2) times a day. 325 mg  
    
   
   
   
  
 LASIX 40 mg tablet Generic drug:  furosemide Your last dose was: Your next dose is: Take 80 mg by mouth daily. 80 mg  
    
   
   
   
  
 loperamide 2 mg capsule Commonly known as:  IMODIUM Your last dose was: Your next dose is: Take 2 mg by mouth daily. 2 mg PROBIOTIC 4X 10-15 mg Tbec Generic drug:  B.infantis-B.ani-B.long-B.bifi Your last dose was: Your next dose is: Take 1 Cap by mouth daily. 1 Cap  
    
   
   
   
  
 raNITIdine 150 mg tablet Commonly known as:  ZANTAC Your last dose was: Your next dose is: Take 150 mg by mouth two (2) times a day. 150 mg  
    
   
   
   
  
 SINGULAIR 10 mg tablet Generic drug:  montelukast  
   
Your last dose was: Your next dose is: Take 10 mg by mouth nightly. 10 mg  
    
   
   
   
  
 VITAMIN B-6 100 mg tablet Generic drug:  pyridoxine (vitamin B6) Your last dose was: Your next dose is: Take 100 mg by mouth daily. 100 mg  
    
   
   
   
  
 vitamin e 1,000 unit capsule Commonly known as:  E GEMS Your last dose was: Your next dose is: Take 1,000 Units by mouth daily. 1000 Units Where to Get Your Medications Information on where to get these meds will be given to you by the nurse or doctor. ! Ask your nurse or doctor about these medications  
  warfarin 3 mg tablet Discharge Instructions Patient Discharge Instructions Jhonathan Jennifer / [id number] : 10/6/1924 Admitted 10/14/2018 Discharged: 10/15/2018 Primary Diagnoses Problem List as of 10/15/2018  Date Reviewed: 10/15/2018 Codes Class Noted - Resolved * (Principal)Hand weakness Chronic atrial fibrillation (HCC) (Chronic) EDWIN (obstructive sleep apnea) (Chronic) Chronic anticoagulation Elevated troponin Abnormal chest x-ray CKD (chronic kidney disease) stage 3, GFR 30-59 ml/min (HCC) (Chronic) Colon cancer ([facility].) (Chronic) Take Home Medications · It is important that you take the medication exactly as they are prescribed.   
· Keep your medication in the bottles provided by the pharmacist and keep a list of the medication names, dosages, and times to be taken in your wallet. · Do not take other medications without consulting your doctor. What to do at [facility] Recommended diet: Cardiac Diet Recommended activity: Activity as tolerated If you experience worse symptoms, please follow up with Neurology. Follow-up with your PCP in a few weeks Information obtained by : 
I understand that if any problems occur once I am at home I am to contact my physician. I understand and acknowledge receipt of the instructions indicated above. Physician's or R.N.'s Signature                                                                  Date/Time Patient or Representative Signature                                                          Date/Time [employer] Announcement We are excited to announce that we are making your provider's discharge notes available to you in [employer]. You will see these notes when they are completed and signed by the physician that discharged you from your recent hospital stay. If you have any questions or concerns about any information you see in [employer], please call the Health Information Department where you were seen or reach out to your Primary Care Provider for more information about your plan of care. Introducing [facility]! Dear Naina Client: 
Thank you for requesting a [employer] account. Our records indicate that you already have an active [employer] account. You can access your account anytime at https://[facility]. [employer]/[facility] Did you know that you can access your hospital and ER discharge instructions at any time in [employer]?   You can also review all of your test results from your hospital stay or ER visit. Additional Information If you have questions, please visit the Frequently Asked Questions section of the [employer] website at [email or web address] Remember, [employer] is NOT to be used for urgent needs. For medical emergencies, dial 911. Now available from your iPhone and Android! Introducing Ruben Atwood As a Kristel Belem patient, I wanted to make you aware of our electronic visit tool called [employer]en Atwood. [employer] allows you to connect within minutes with a medical provider 24 hours a day, seven days a week via a mobile device or tablet or logging into a secure website from your computer. You can access Ruben Atwood from anywhere in the [place]. A virtual visit might be right for you when you have a simple condition and feel like you just dont want to get out of bed, or cant get away from work for an appointment, when your regular Kristel Patricia provider is not available (evenings, weekends or holidays), or when youre out of town and need minor care. Electronic visits cost only $49 and if the Kristel Patricia [employer] provider determines a prescription is needed to treat your condition, one can be electronically transmitted to a nearby pharmacy*. Please take a moment to enroll today if you have not already done so. The enrollment process is free and takes just a few minutes. To enroll, please download the [employer] samy to your tablet or phone, or visit [email or web address] to enroll on your computer. And, as an [address] [place] [address] patient with a [employer] account, the results of your visits will be scanned into your electronic medical record and your primary care provider will be able to view the scanned results. We urge you to continue to see your regular Kristel Patricia provider for your ongoing medical care.   And while your primary care provider may not be the one available when you seek a [employer] virtual visit, the peace of mind you get from getting a real diagnosis real time can be priceless. For more information on [employer], view our Frequently Asked Questions (FAQs) at [email or web address]. Sincerely, 
 
Twanna Lefort, MD 
Chief Medical Officer Rell Abreu *:  certain medications cannot be prescribed via [employer] Unresulted Labs-Please follow up with your PCP about these lab tests Order Current Status DUPLEX CAROTID BILATERAL Preliminary result Providers Seen During Your Hospitalization Provider Specialty Primary office phone Joseph Padilla MD Emergency Medicine [phone] Josefina Florentino MD Internal Medicine [phone] Immunizations Administered for This Admission Name Date Influenza Vaccine (Quad) PF  Deferred () Your Primary Care Physician (PCP) Primary Care Physician Office Phone Office Fax [place] [phone] You are allergic to the following No active allergies Recent Documentation Height Weight BMI Smoking Status 1.778 m 64.5 kg 20.42 kg/m2 Former Smoker Emergency Contacts Name Discharge Info Relation Home Work Mobile Indu Singh DISCHARGE CAREGIVER [3] Spouse [3] [phone] Patient Belongings The following personal items are in your possession at time of discharge: 
  Dental Appliances: None  Visual Aid: At bedside, Glasses      Home Medications: None   Jewelry: None  Clothing: None    Other Valuables: None  Personal Items Sent to Safe: NA Discharge Instructions Attachments/References ISCHEMIC STROKE: GENERAL INFO (ENGLISH) TIA (TRANSIENT ISCHEMIC ATTACK) (ENGLISH) ISCHEMIC STROKE MEDICINES: GENERAL INFO (ENGLISH) Patient Handouts Learning About an Ischemic Stroke What is an ischemic stroke? An ischemic (say \"psr-ZME-gojt\") stroke occurs when a blood clot blocks a blood vessel in the brain. This means that blood cannot flow to some part of the brain. Without blood and the oxygen it carries, this part of the brain starts to die. The part of the body controlled by the damaged area of the brain cannot work properly. This is different from a hemorrhagic (say \"ois-tck-YO-haylieck\") stroke, which happens when a blood vessel in the brain has burst open or has started to leak. The brain damage from a stroke starts within minutes. Quick treatment can help limit damage to the brain and make recovery more likely. People who have had a stroke may have a hard time talking, understanding things, and making decisions. They may have to relearn daily activities, such as how to eat, bathe, and dress. How well someone recovers from a stroke depends on how quickly the person gets to the hospital, where in the brain the stroke happened, and how severe it was. Training and therapy also make a difference in how well people recover. What are the symptoms? If you have any of these symptoms, call 911 or other emergency services right away: 
· You have symptoms of a stroke. These may include: 
¨ Sudden numbness, tingling, weakness, or loss of movement in your face, arm, or leg, especially on only one side of your body. ¨ Sudden vision changes. ¨ Sudden trouble speaking. ¨ Sudden confusion or trouble understanding simple statements. ¨ Sudden problems with walking or balance. ¨ A sudden, severe headache that is different from past headaches. See your doctor if you have symptoms that seem like a stroke, even if they go away quickly. You may have had a transient ischemic attack (TIA), sometimes called a mini-stroke. A TIA is a warning that a stroke may happen soon. Getting early treatment for a TIA can help prevent a stroke. What causes an ischemic stroke? An ischemic stroke is caused by a blood clot that blocks blood flow in the brain. The most common causes of blood clots include: 
· Hardening of the arteries (atherosclerosis). This is caused by high blood pressure, diabetes, high cholesterol, or smoking. · Atrial fibrillation. How is ischemic stroke treated? You may have to take several medicines, depending on what caused your stroke. Ask your doctor if a stroke rehab program is right for you. Rehab increases your chances of getting back some of the abilities you lost. 
How can you prevent another stroke? · Work with your doctor to treat any health problems you have. High blood pressure, high cholesterol, atrial fibrillation, and diabetes all raise your chances of having a stroke. · Be safe with medicines. Take your medicine exactly as prescribed. Call your doctor if you think you are having a problem with your medicine. · Have a healthy lifestyle. ¨ Do not smoke or allow others to smoke around you. If you need help quitting, talk to your doctor about stop-smoking programs and medicines. These can increase your chances of quitting for good. Smoking makes a stroke more likely. ¨ Limit alcohol to 2 drinks a day for men and 1 drink a day for women. ¨ Lose weight if you need to. A healthy weight will help you keep your heart and body healthy. ¨ Be active. Ask your doctor what type and level of activity is safe for you. ¨ Eat heart-healthy foods, like fruits, vegetables, and high-fiber foods. Follow-up care is a key part of your treatment and safety. Be sure to make and go to all appointments, and call your doctor if you are having problems. It's also a good idea to know your test results and keep a list of the medicines you take. Where can you learn more? Go to http://gwyn-rich.info/. Enter D161 in the search box to learn more about \"Learning About an Ischemic Stroke. \" Current as of: November 21, 2017 Content Version: 11.8 © 8273-4512 Che[employer]Ã¡. Care instructions adapted under license by [employer] (which disclaims liability or warranty for this information). If you have questions about a medical condition or this instruction, always ask your healthcare professional. [address] any warranty or liability for your use of this information. Transient Ischemic Attack: Care Instructions Your Care Instructions A transient ischemic attack (TIA) is when blood flow to a part of your brain is blocked for a short time. A TIA is like a stroke but usually lasts only a few minutes. A TIA does not cause lasting brain damage. Any vision problems, slurred speech, or other symptoms usually go away in 10 to 20 minutes. But they may last for up to 24 hours. TIAs are often warning signs of a stroke. Some people who have a TIA may have a stroke in the future. A stroke can cause symptoms like those of a TIA. But a stroke causes lasting damage to your brain. You can take steps to help prevent a stroke. One thing you can do is get early treatment. If you have other new symptoms, or if your symptoms do not get better, go back to the emergency room or call your doctor right away. Getting treatment right away may prevent long-term brain damage caused by a stroke. The doctor has checked you carefully, but problems can develop later. If you notice any problems or new symptoms, get medical treatment right away. Follow-up care is a key part of your treatment and safety. Be sure to make and go to all appointments, and call your doctor if you are having problems. It's also a good idea to know your test results and keep a list of the medicines you take. How can you care for yourself at home? Medicines 
  · Be safe with medicines. Take your medicines exactly as prescribed. Call your doctor if you think you are having a problem with your medicine.   · If you take a blood thinner, such as aspirin, be sure you get instructions about how to take your medicine safely. Blood thinners can cause serious bleeding problems.  
  · Call your doctor if you are not able to take your medicines for any reason.  
  · Do not take any over-the-counter medicines or herbal products without talking to your doctor first.  
  · If you take birth control pills or hormone therapy, talk to your doctor. Ask if these treatments are right for you.  
 Lifestyle changes 
  · Do not smoke. If you need help quitting, talk to your doctor about stop-smoking programs and medicines.  
  · Be active. If your doctor recommends it, get more exercise. Walking is a good choice. Bit by bit, increase the amount you walk every day. Try for at least 30 minutes on most days of the week. You also may want to swim, bike, or do other activities.  
  · Eat heart-healthy foods. These include fruits, vegetables, high-fiber foods, fish, and foods that are low in sodium, saturated fat, and trans fat.  
  · Stay at a healthy weight. Lose weight if you need to.  
  · Limit alcohol to 2 drinks a day for men and 1 drink a day for women.  
 Staying healthy 
  · Manage other health problems such as diabetes, high blood pressure, and high cholesterol.  
  · Get the flu vaccine every year. When should you call for help? Call 911 anytime you think you may need emergency care. For example, call if: 
  · You have new or worse symptoms of a stroke. These may include: 
¨ Sudden numbness, tingling, weakness, or loss of movement in your face, arm, or leg, especially on only one side of your body. ¨ Sudden vision changes. ¨ Sudden trouble speaking. ¨ Sudden confusion or trouble understanding simple statements. ¨ Sudden problems with walking or balance. ¨ A sudden, severe headache that is different from past headaches. Call 911 even if these symptoms go away in a few minutes.   · You feel like you are having another TIA.  
 Watch closely for changes in your health, and be sure to contact your doctor if you have any problems. Where can you learn more? Go to http://gwyn-rich.info/. Enter [phone] [id number] in the search box to learn more about \"Transient Ischemic Attack: Care Instructions. \" Current as of: November 21, 2017 Content Version: 11.8 © 8470-7606 [employer]. Care instructions adapted under license by [employer] (which disclaims liability or warranty for this information). If you have questions about a medical condition or this instruction, always ask your healthcare professional. [address] any warranty or liability for your use of this information. Learning About Medicines That Lower Your Risk of Another Stroke Introduction After you have a stroke, going home may be hard, both for you and for your loved ones. There is a lot to think about. Remember to take one day at a time. An important part of your treatment will be to prevent another stroke. And a big part of that is taking medicines. Some of the medicines your doctor may have you take include: · Aspirin or other blood thinners. They help prevent blood clots. Most strokes are caused by blood clots. · Statins to lower cholesterol. High cholesterol raises your stroke risk. · Medicines for high blood pressure or diabetes. These conditions raise your stroke risk. All medicines can cause side effects. So it is important to understand the pros and cons of any medicine you take. It is also important to take your medicines exactly as your doctor tells you to. Be sure to tell your doctor if you take any other prescription medicine, over-the-counter medicine, vitamins, supplements, or herbal remedies. Have a pill plan You may have several pills to take every day.  Having a plan for how you will remember to take them may help ease your fears and stress. To make a pill plan, write a list of your medicines. Then write down the details for each one. Include when you started using each medicine, when you take it, how much you take each time (number of pills and milligrams in each pill), and any side effects. Before you leave the hospital, be sure to ask questions if you don't understand or need help with your pill plan. And be sure you know who to call when you have questions at home. Blood thinners One of the best things you can do to prevent another stroke is to take a medicine called a blood thinner. These medicines don't really thin your blood. They work by helping to prevent blood clots. Blood clots can cause a stroke if they block a blood vessel in the brain. So when you prevent blood clots, you help prevent a stroke. Antiplatelets are a type of blood thinner. They help keep platelets from sticking together and forming blood clots. (A platelet is a type of blood cell.) Examples of antiplatelets include: · Aspirin (Bj, Bufferin, Ecotrin). · Aspirin combined with dipyridamole (Aggrenox). · Clopidogrel (Plavix). Another type of blood thinner, called an anticoagulant, may be used if you also have atrial fibrillation. This is a heart rhythm problem. It raises your risk of having a stroke. Be sure to learn how to take your medicine safely. Blood thinners can cause serious bleeding problems. Statins Statins lower the amount of cholesterol in your blood. If you have too much cholesterol, it starts to build up in blood vessels. And that's how most heart and blood flow problems, including strokes, start. Statins also reduce inflammation around the cholesterol buildup. This may lower the risk that the buildup will break apart and cause a blood clot that can lead to a stroke. Examples of statins include: · Atorvastatin (Lipitor). · Lovastatin (Mevacor). · Pravastatin (Pravachol). · Simvastatin (Zocor). Blood pressure medicines If you have high blood pressure, you may take medicines to lower it. High blood pressure damages blood vessels. Damaged vessels clog up more easily. And that can cause a stroke. If you were taking blood pressure pills before, your doctor may have you keep taking them. Or your doctor may have you take a different type. Blood pressure medicines may include: · ACE (angiotensin-converting enzyme) inhibitors. · Angiotensin II receptor blockers (ARBs). · Beta-blockers. · Diuretics (water pills). · Calcium channel blockers. Follow-up care is a key part of your treatment and safety. Be sure to make and go to all appointments, and call your doctor if you are having problems. It's also a good idea to know your test results and keep a list of the medicines you take. Where can you learn more? Go to http://gwyn-rich.info/. Enter J841 in the search box to learn more about \"Learning About Medicines That Lower Your Risk of Another Stroke. \" Current as of: November 21, 2017 Content Version: 11.8 © 4028-3704 [employer]. Care instructions adapted under license by [employer] (which disclaims liability or warranty for this information). If you have questions about a medical condition or this instruction, always ask your healthcare professional. [address] any warranty or liability for your use of this information. Please provide this summary of care documentation to your next provider. Signatures-by signing, you are acknowledging that this After Visit Summary has been reviewed with you and you have received a copy. Patient Signature:  ____________________________________________________________ Date:  ____________________________________________________________  
  
Gay Valencia Provider Signature:  ____________________________________________________________ Date:  ____________________________________________________________

## 2018-10-14 NOTE — IP AVS SNAPSHOT
Dandre Camejo 
 
 
 [address] [facility] [address] [facility] 
[phone] Patient: Kirby Crowell MRN: [id number] :10/6/1924 A check boni indicates which time of day the medication should be taken. My Medications START taking these medications Instructions Each Dose to Equal  
 Morning Noon Evening Bedtime  
 warfarin 3 mg tablet Commonly known as:  COUMADIN Your last dose was: Your next dose is: Take 1 Tab by mouth daily for 7 doses. Indications: PREVENT THROMBOEMBOLISM IN CHRONIC ATRIAL FIBRILLATION  
 3 mg CONTINUE taking these medications Instructions Each Dose to Equal  
 Morning Noon Evening Bedtime  
 cholestyramine 4 gram packet Commonly known as:  Octaviano Lucas Your last dose was: Your next dose is: Take 1 Packet by mouth nightly. 1 Packet  
    
   
   
   
  
 ferrous sulfate 325 mg (65 mg iron) tablet Your last dose was: Your next dose is: Take 325 mg by mouth two (2) times a day. 325 mg  
    
   
   
   
  
 LASIX 40 mg tablet Generic drug:  furosemide Your last dose was: Your next dose is: Take 80 mg by mouth daily. 80 mg  
    
   
   
   
  
 loperamide 2 mg capsule Commonly known as:  IMODIUM Your last dose was: Your next dose is: Take 2 mg by mouth daily. 2 mg PROBIOTIC 4X 10-15 mg Tbec Generic drug:  B.infantis-B.ani-B.long-B.bifi Your last dose was: Your next dose is: Take 1 Cap by mouth daily. 1 Cap  
    
   
   
   
  
 raNITIdine 150 mg tablet Commonly known as:  ZANTAC Your last dose was: Your next dose is: Take 150 mg by mouth two (2) times a day. 150 mg  
    
   
   
   
  
 SINGULAIR 10 mg tablet Generic drug:  montelukast  
   
Your last dose was: Your next dose is: Take 10 mg by mouth nightly. 10 mg  
    
   
   
   
  
 VITAMIN B-6 100 mg tablet Generic drug:  pyridoxine (vitamin B6) Your last dose was: Your next dose is: Take 100 mg by mouth daily. 100 mg  
    
   
   
   
  
 vitamin e 1,000 unit capsule Commonly known as:  E GEMS Your last dose was: Your next dose is: Take 1,000 Units by mouth daily. 1000 Units Where to Get Your Medications Information on where to get these meds will be given to you by the nurse or doctor. ! Ask your nurse or doctor about these medications  
  warfarin 3 mg tablet

## 2018-10-14 NOTE — ED PROVIDER NOTES
HPI Comments: 80 y.o. male with past medical history significant for A-fib, GERD, leukemia, and anemia who presents with chief complaint of right hand weakness. Pt complains of right hand weakness and numbness that started about 1 hour ago at 1320 when he was trying to buckle his belt. Pt states upon arrival to ED at 1410 symptoms have resolved. Per EMS, pt had a normal gait and a blood sugar of 108. Pt is noted to have recently been taken off of coumadin. Pt denies any previous symptoms like this before. Pt denies change in speech, headache, chest pain, SOB, or abdominal pain. There are no other acute medical concerns at this time. Social hx: Former Smoker. Occasional EtOH Use. PCP: Ashely Rosales MD 
 
Note written by Tu Ibarra. Tran Herman, as dictated by Ji Ace MD 2:11 PM 
 
 
Patient is a 80 y.o. male presenting with weakness. The history is provided by the patient and the EMS personnel. Extremity Weakness Associated symptoms include numbness. Pertinent negatives include no back pain. Past Medical History:  
Diagnosis Date  Arrhythmia A-Fib. 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   
 colon  GERD (gastroesophageal reflux disease)  Ill-defined condition Afib  Ill-defined condition   
 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   
 doesn't use c-pap. Past Surgical History:  
Procedure Laterality Date  ABDOMEN SURGERY PROC UNLISTED    
 colon resection  COLONOSCOPY N/A 6/22/2016 COLONOSCOPY performed by Bryan Chester MD at [address]  HX ORTHOPAEDIC  2002  
 broken right leg -repair  HX OTHER SURGICAL    
 hemmrhoidectomy  HX TONSILLECTOMY Family History:  
Problem Relation Age of Onset  Cancer Mother  Cancer Brother Social History Social History  Marital status:  Spouse name: N/A  
 Number of children: N/A  
 Years of education: N/A Occupational History  Not on file. Social History Main Topics  Smoking status: Former Smoker Packs/day: 2.00 Quit date: 11/8/1980  Smokeless tobacco: Never Used  Alcohol use 0.5 oz/week 1 Glasses of wine per week Comment: glass with dinner nightly  Drug use: No  
 Sexual activity: Not on file Other Topics Concern  Not on file Social History Narrative ALLERGIES: Review of patient's allergies indicates no known allergies. Review of Systems Constitutional: Negative for fever. Eyes: Negative for visual disturbance. Respiratory: Negative for cough, shortness of breath and wheezing. Cardiovascular: Negative for chest pain and leg swelling. Gastrointestinal: Negative for abdominal pain, diarrhea, nausea and vomiting. Genitourinary: Negative for dysuria. Musculoskeletal: Negative. Negative for back pain and neck stiffness. Skin: Negative for rash. Neurological: Positive for weakness and numbness. Negative for syncope, speech difficulty and headaches. Psychiatric/Behavioral: Negative for confusion. All other systems reviewed and are negative. There were no vitals filed for this visit. Physical Exam  
Constitutional: He appears well-developed and well-nourished. No distress. HENT:  
Head: Normocephalic. Eyes: Pupils are equal, round, and reactive to light. Neck: Normal range of motion. Cardiovascular: Normal rate and regular rhythm. Murmur heard. Systolic murmur is present with a grade of 3/6 Pulmonary/Chest: Effort normal and breath sounds normal. No respiratory distress. Abdominal: Soft. There is no tenderness. Musculoskeletal: Normal range of motion. He exhibits no edema. Neurological: He is alert. No cranial nerve deficit. Gait normal.  
Minimal right  weakness. No pronator drift. Skin: Skin is warm and dry. Psychiatric: He has a normal mood and affect. His behavior is normal.  
Nursing note and vitals reviewed. Note written by Wero Keenan. Yudy Villegas, as dictated by Ashish Grant MD 2:12 PM 
 
MDM Number of Diagnoses or Management Options TIA (transient ischemic attack): Amount and/or Complexity of Data Reviewed Clinical lab tests: ordered and reviewed Obtain history from someone other than the patient: yes Review and summarize past medical records: yes Discuss the patient with other providers: yes Independent visualization of images, tracings, or specimens: yes Risk of Complications, Morbidity, and/or Mortality Presenting problems: high Diagnostic procedures: high Management options: high Critical Care Total time providing critical care: 30-74 minutes ED Course Procedures CONSULT NOTE: 
2:33 PM Ashish Grant MD spoke with Dr. Huong Guo, Consult for Teleneurology. Discussed available diagnostic tests and clinical findings. Dr. Huong Guo recommends admission for stroke evaluation. ED EKG interpretation: 
Rhythm: atrial fib; and regular . Rate (approx.): 59; Axis: normal; ST/T wave: normal; RBBB; No acute changes since previous tracing from 11/08/2012. Note written by Wero Keenan. Yudy Villegas, as dictated by Ashish Grant MD 2:57 PM 
 
CONSULT NOTE: 
3:25 PM Ashish Grant MD spoke with Dr. Dani Angeles via [employer], Consult for Hospitalist.  Discussed available diagnostic tests and clinical findings. Dr. Dani Angeles will admit pt.

## 2018-10-14 NOTE — ED TRIAGE NOTES
Pt arrives via EMS for c/o right arm weakness/numbess around 1:20 today. No AMS, no speech issues, no other complaints at this time.  via EMS.

## 2018-10-14 NOTE — ED NOTES
Pt to CT on stretcher. Unable to spontaneously move right hand upon arrival, though sensation was intact. Function gradually returned fully before transport to to CT. Pt refused to change into gown.

## 2018-10-14 NOTE — PROGRESS NOTES
SHIFT CHANGE: 
1930 Bedside and Verbal shift change report given to Teetee OGDEN (oncoming nurse) by Oralia Palomino (offgoing nurse). Report included the following information SBAR, Kardex, MAR and Recent Results. SHIFT SUMMARY: 
1935  Patient up to bathroom, insisting on walking independently. Explained to patient importance of safety in lieu of hand weakness, will standby to assist outside bathroom door. Does not want to wear compression stockings, but will allow knee high stockings. Will standby until patient returns to bed. 
2141  Troponin unchanged. Stand test performed, passed. 0427  Troponin unchanged, VS stable. No issues to report at this time. New IV attempt unsuccessful, will attempt at another time. Patient has working IV. HR has gotten down to 39, unsustained. Baseline afib running 50's - 60's while sleeping. Will continue to monitor. Patient Vitals for the past 12 hrs: 
 Temp Pulse Resp BP SpO2  
10/15/18 0311 97.9 °F (36.6 °C) 62 16 137/72 91 % 10/14/18 2331 98.2 °F (36.8 °C) 67 16 142/71 92 % 10/14/18 2300 - 65 - - -  
10/14/18 1925 97.7 °F (36.5 °C) (!) 59 12 145/69 96 % 10/14/18 1713 - - - - 98 % END OF SHIFT REPORT: 
0730  Bedside and Verbal shift change report given to Christy (oncoming nurse) by Greg Carney (offgoing nurse). Report included the following information SBAR, Kardex, MAR and Recent Results.

## 2018-10-14 NOTE — PROGRESS NOTES
[facility]: MED RECONCILIATION Comments/Recommendations:  
? Patient alert and oriented for medication reconciliation and very knowledgeable regarding medications. ? Patient took all AM medications today. ? Patient's warfarin was stopped on 10/6/18 per his GI/cardiologist due to blood in stool. Patient has cardiologist appt this week to follow-up. ? Confirmed NKDA and updated pharmacy to Roseline Aguilera on [employer]. Medications added:  
 
· Ranitidine · Cholestyramine · imodium Medications removed: · Ferric gluconate · Omeprazole-replaced with ranitidine · Warfarin-see above, medication stopped on 10/6/18 Medications adjusted: · Ferrous sulfate BID adjusted from daily Information obtained from: patient, Rx query Allergies: Review of patient's allergies indicates no known allergies. Prior to Admission Medications:  
 
Medication Documentation Review Audit Reviewed by Werner Kaur (Pharmacist) on 10/14/18 at [id number] Medication Sig Documenting Provider Last Dose Status Taking? B.infantis-B.ani-B.long-B.bifi (PROBIOTIC 4X) 10-15 mg TbEC Take 1 Cap by mouth daily. Rayne Del Valle MD 10/14/2018 AM Active Yes  
 cholestyramine (QUESTRAN) 4 gram packet Take 1 Packet by mouth nightly. Historical Provider 10/13/2018 PM Active Yes  
 ferrous sulfate 325 mg (65 mg iron) tablet Take 325 mg by mouth two (2) times a day. Historical Provider 10/14/2018 AM Active Yes  
 furosemide (LASIX) 40 mg tablet Take 80 mg by mouth daily. Rayne Del Valle MD 10/14/2018 AM Active Yes  
 loperamide (IMODIUM) 2 mg capsule Take 2 mg by mouth daily. Historical Provider 10/14/2018 AM Active Yes  
 montelukast (SINGULAIR) 10 mg tablet Take 10 mg by mouth nightly. Historical Provider 10/13/2018 PM Active Yes  Med Note (WERNER KAUR Oct 14, 2018  3:48 PM):   
  
 pyridoxine, vitamin B6, (VITAMIN B-6) 100 mg tablet Take 100 mg by mouth daily. Rayne Del Valle MD 10/14/2018 AM Active Yes  
 raNITIdine (ZANTAC) 150 mg tablet Take 150 mg by mouth two (2) times a day. Historical Provider 10/14/2018 AM Active Yes  
 vitamin e (E GEMS) 1,000 unit capsule Take 1,000 Units by mouth daily. Rayne Del Valle MD 10/14/2018 AM Active Yes Thank Bud Clayton, PharmD  Contact: [phone]

## 2018-10-14 NOTE — ED NOTES
TRANSFER - OUT REPORT: 
 
Verbal report given to Christy RN(name) on Fahad Leslie  being transferred to [facility](unit) for routine progression of care Report consisted of patients Situation, Background, Assessment and  
Recommendations(SBAR). Information from the following report(s) SBAR, Kardex, ED Summary, [facility] and Cardiac Rhythm afib was reviewed with the receiving nurse. Lines:  
Peripheral IV 10/14/18 Left Arm (Active) Site Assessment Clean, dry, & intact 10/14/2018  2:41 PM  
Phlebitis Assessment 0 10/14/2018  2:41 PM  
Infiltration Assessment 0 10/14/2018  2:41 PM  
Dressing Status Clean, dry, & intact 10/14/2018  2:41 PM  
Dressing Type Transparent;Tape 10/14/2018  2:41 PM  
Hub Color/Line Status Green;Flushed;Patent 10/14/2018  2:41 PM  
Action Taken Blood drawn 10/14/2018  2:41 PM  
  
 
Opportunity for questions and clarification was provided. Patient transported with: 
 Monitor Registered Nurse Tech

## 2018-10-14 NOTE — PROGRESS NOTES
TRANSFER - IN REPORT: 
 
Verbal report received from [facility] [facility] RN(name) on Abbi Valdovinos  being received from ED(unit) for routine progression of care Report consisted of patients Situation, Background, Assessment and  
Recommendations(SBAR). Information from the following report(s) SBAR, Kardex and Med Rec Status was reviewed with the receiving nurse. Opportunity for questions and clarification was provided. Assessment completed upon patients arrival to unit and care assumed. .. Primary Nurse Cristy Umanzor RN and Ariel Solitario RN performed a dual skin assessment on this patient No impairment noted Kirill score is 23 
 
1830- TO MRI DEPT VIA WC. 
 
1945- Back to floor , could not do MRI as pt is very SOB/WHITEHEAD. Luis E Moyer Could not lie down on the MRI table. 1900-Bedside and Verbal shift change report given to Christi Kwong RN  (oncoming nurse) by Luis Alberto Stern RN (offgoing nurse). Report included the following information SBAR, Kardex and Recent Results

## 2018-10-14 NOTE — H&P
[address] 
[phone] Admission History and Physical 
 
 
NAME:  Sameer Humphrey :   10/6/1924 MRN:  [id number] PCP:  Ella Soto MD  
 
Date/Time:  10/14/2018 Subjective: CHIEF COMPLAINT: Weakness HISTORY OF PRESENT ILLNESS:    
Mr. Llia Hopkins is a 80 y.o.  male who is admitted with weakness. Mr. Lila Hopkins presented to the Emergency Department this PM complaining of weakness: right hand, sudden onset, this afternoon ~1:20, constant, moderate to severe, assoicated with numbness, resolved after arrival to the ED. He has a history of chronic atrial fibrillation on warfarin and his INR was subtherapeutic today because he has been off warfarin for the past 1.5 weeks due to GI bleeding. He received a transfusion recently for anemia but was reportedly cleared by GI with no apparent source of bleeding. History obtained from spouse and child, chart review and the patient. Previous records reviewed, admits for colon cancer and endoscopy Past Medical History:  
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   
 colon  Chronic atrial fibrillation ([facility] [address] 10/14/2018  GERD (gastroesophageal reflux disease)  Ill-defined condition   
 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   
 doesn't use c-pap. Past Surgical History:  
Procedure Laterality Date  ABDOMEN SURGERY PROC UNLISTED    
 colon resection  COLONOSCOPY N/A 2016 COLONOSCOPY performed by Merlinda Rua, MD at [address]  HX ORTHOPAEDIC  2002  
 broken right leg -repair  HX OTHER SURGICAL    
 hemmrhoidectomy  HX TONSILLECTOMY Social History Substance Use Topics  Smoking status: Former Smoker Packs/day: 2.00 Quit date: 11/8/1980  Smokeless tobacco: Never Used  Alcohol use 0.5 oz/week 1 Glasses of wine per week Comment: glass with dinner nightly Family History Problem Relation Age of Onset  Cancer Mother  Cancer Brother No Known Allergies Prior to Admission medications Medication Sig Start Date End Date Taking? Authorizing Provider  
ferrous sulfate 325 mg (65 mg iron) tablet Take 325 mg by mouth two (2) times a day. Yes Historical Provider  
raNITIdine (ZANTAC) 150 mg tablet Take 150 mg by mouth two (2) times a day. Yes Historical Provider  
cholestyramine (QUESTRAN) 4 gram packet Take 1 Packet by mouth nightly. Yes Historical Provider  
loperamide (IMODIUM) 2 mg capsule Take 2 mg by mouth daily. Yes Historical Provider  
furosemide (LASIX) 40 mg tablet Take 80 mg by mouth daily. Yes MD ARANZA Acuñainfantis-TI.ani-B.long-B.bifi (PROBIOTIC 4X) 10-15 mg TbEC Take 1 Cap by mouth daily. Yes Rayne Del Valle MD  
vitamin e (E GEMS) 1,000 unit capsule Take 1,000 Units by mouth daily. Yes Rayne Del Valle MD  
pyridoxine, vitamin B6, (VITAMIN B-6) 100 mg tablet Take 100 mg by mouth daily. Yes Rayne Del Valle MD  
montelukast (SINGULAIR) 10 mg tablet Take 10 mg by mouth nightly. Yes Historical Provider Review of Systems: 
(bold if positive, if negative) Gen:  Eyes:  ENT:  CVS:  Pulm:  GI:   
:   
MS:  Skin:  Psych:  Endo:   
Hem:  Renal:   
Neuro:  Numbnessweakness Objective: VITALS:   
Vital signs reviewed; most recent are: 
 
Visit Vitals  /69 (BP 1 Location: Left arm, BP Patient Position: At rest)  Pulse 60  Temp 97.5 °F (36.4 °C)  Resp 16  
 Ht 5' 10\" (1.778 m)  Wt 64.8 kg (142 lb 12.8 oz)  SpO2 94%  BMI 20.49 kg/m2 SpO2 Readings from Last 6 Encounters:  
10/14/18 94% 08/10/18 95% 02/03/18 96% 06/22/16 95% 04/25/15 97% 12/11/13 98% No intake or output data in the 24 hours ending 10/14/18 1612 Exam:  
 
Physical Exam: 
 
Gen: Well-developed, well-nourished, frail, elderly, in no acute distress HEENT:  Pink conjunctivae, PERRL, hearing intact to voice, moist mucous membranes Neck: Supple, without masses, thyroid non-tender Resp: No accessory muscle use, clear breath sounds without wheezes rales or rhonchi 
Card: No murmurs, normal S1, S2 without thrills, bruits or peripheral edema Abd:  Soft, non-tender, non-distended, normoactive bowel sounds are present, no palpable organomegaly and no detectable hernias Lymph:  No cervical or inguinal adenopathy Musc: No cyanosis or clubbing Skin: No rashes or ulcers, skin turgor is good Neuro:  Cranial nerves are grossly intact, no focal motor weakness, follows commands appropriately Psych:  Good insight, oriented to person, place and time, alert Labs: 
 
Recent Labs 10/14/18 [address] WBC  7.4 HGB  10.4* HCT  33.1*  
PLT  297 Recent Labs 10/14/18 [address] NA  141  
K  4.0  
CL  104 CO2  32 GLU  84 BUN  29* CREA  1.79* CA  8.8 ALB  3.0* TBILI  0.8 SGOT  24 ALT  25 No results found for: Zamzam Left No results for input(s): PH, PCO2, PO2, HCO3, FIO2 in the last 72 hours. Recent Labs 10/14/18 [address] INR  1.3* Additional testing:  Chest X-ray: with right pleural effusion and right sided air space disease vs edema, visualized by me. Results not reviewed with Radiologist. 
 
  
Assessment/Plan:   
  
Principal Problem: 
  Hand weakness (10/14/2018) - suspect TIA, especially likely in light of chronic afib with inadequate anticoagulation 
 - consult Neurology - MRI/MRA, carotids, echo, lipids 
 - PT/OT to see, no need for Speech Active Problems: 
  Colon cancer ([address] (11/15/2012) 
 - reportedly CATIE, outpatient follow up Chronic atrial fibrillation (HCC) (10/14/2018)/Chronic anticoagulation (10/14/2018) - I favor resuming anticoagulation per Pharmacy warfarin protocol because of his risk of stroke 
 - will bridge with enoxaparin now 
 - will ask both Cardiology and GI to see him while he is here, however, to make sure this is okay EDWIN (obstructive sleep apnea) (10/14/2018) - does not use CPAP, counseled this increases risk of stroke Elevated troponin (10/14/2018) - minimally elevated with no cardiac symptoms 
 - will trend enzymes and check echocardiogram, Cardiology to see as above Abnormal chest x-ray (10/14/2018) - effusion and ASDz c/w CHF or less likely pneumonia 
 - he is symptomatic for neither pneumonia nor CHF; sats 94% on RA at rest 
 - BNP is elevated, so this may be CHF; echo as above - would like to get a contrast chest CT to get a better look at this but given his renal disease will consult Pulmonary, perhaps a non-contrast CT will be helpful but defer imaging choice to them CKD 3 
 - creatinine is above prior baseline but that was several years ago, current creatinine may just be progression of baseline; he isn't sure what his baseline is but he had recent labs with Dr. Codi Roman 
 - hold Lasix for now, will hold off on giving IV fluids due to elevated BNP Code status: 
 - patient is FULL CODE, no AMD on file, wife Nick Nicole is his surrogate Total time spent on patient care: 70 Minutes Care Plan discussed with: Patient, Family, Nursing Staff and Dr. Lucia De Los Santos Discussed:  Code Status, Care Plan and D/C Planning Prophylaxis:  Lovenox and Coumadin 
 
Probable Disposition:  Home w/Family 
        
___________________________________________________ Attending Physician: Emanuel Gomez MD

## 2018-10-14 NOTE — PROGRESS NOTES
Pharmacy Dosing Services: 10/14/18 Consult for Enoxaparin by Dr. Dolly Segal Consult made for this 80 y.o. male, for bridging with warfarin for atrial fibrillation. Wt Readings from Last 1 Encounters:  
10/14/18 64.8 kg (142 lb 12.8 oz) Ht Readings from Last 1 Encounters:  
10/14/18 177.8 cm (70\") Previous Dose Enoxaparin 60mg every 12 hours Creatinine Clearance Estimated Creatinine Clearance: 23.1 mL/min (based on Cr of 1.79). Creatinine Lab Results Component Value Date/Time Creatinine 1.79 (H) 10/14/2018 02:37 PM  
  
  
Platelet Lab Results Component Value Date/Time PLATELET 671 88/87/5642 02:37 PM  
 
  
H/H Lab Results Component Value Date/Time HGB 10.4 (L) 10/14/2018 02:37 PM  
 
  
 
Pharmacist made change to enoxaparin therapy based on: 
[ X ] Renal function: dose changed to: 60mg every 24 hours for CrCl <30mL/min (currently 23. 1mL/min) ? Pharmacy to automatically make dose adjustment for renal dysfunction (creatinine clearance less than 30 mL/min) ? Pharmacy to automatically make dose adjustment for obesity (BMI greater than 40) ? Pharmacy to make dose rounding adjustments per [facility] dose adjustment scale. Pharmacy to monitor patients progress. Will make dose adjustment as needed per changing renal function. Will communicate further recommendations regarding patients anticoagulation therapy with prescriber. Signed Werner Kaur . Contact information: [phone]

## 2018-10-15 ENCOUNTER — APPOINTMENT (OUTPATIENT)
Dept: CT IMAGING | Age: 83
End: 2018-10-15
Attending: PSYCHIATRY & NEUROLOGY
Payer: MEDICARE

## 2018-10-15 VITALS
RESPIRATION RATE: 19 BRPM | BODY MASS INDEX: 20.37 KG/M2 | DIASTOLIC BLOOD PRESSURE: 56 MMHG | SYSTOLIC BLOOD PRESSURE: 121 MMHG | OXYGEN SATURATION: 98 % | HEIGHT: 70 IN | TEMPERATURE: 96.5 F | WEIGHT: 142.3 LBS | HEART RATE: 62 BPM

## 2018-10-15 LAB
ANION GAP SERPL CALC-SCNC: 7 MMOL/L (ref 5–15)
ATRIAL RATE: 54 BPM
BUN SERPL-MCNC: 29 MG/DL (ref 6–20)
BUN/CREAT SERPL: 17 (ref 12–20)
CALCIUM SERPL-MCNC: 8.6 MG/DL (ref 8.5–10.1)
CALCULATED R AXIS, ECG10: 46 DEGREES
CALCULATED T AXIS, ECG11: -3 DEGREES
CHLORIDE SERPL-SCNC: 104 MMOL/L (ref 97–108)
CHOLEST SERPL-MCNC: 107 MG/DL
CO2 SERPL-SCNC: 28 MMOL/L (ref 21–32)
CREAT SERPL-MCNC: 1.7 MG/DL (ref 0.7–1.3)
DIAGNOSIS, 93000: NORMAL
ERYTHROCYTE [DISTWIDTH] IN BLOOD BY AUTOMATED COUNT: 16.9 % (ref 11.5–14.5)
EST. AVERAGE GLUCOSE BLD GHB EST-MCNC: NORMAL MG/DL
GLUCOSE SERPL-MCNC: 83 MG/DL (ref 65–100)
HBA1C MFR BLD: 4.7 % (ref 4.2–6.3)
HCT VFR BLD AUTO: 33.8 % (ref 36.6–50.3)
HDLC SERPL-MCNC: 71 MG/DL
HDLC SERPL: 1.5 {RATIO} (ref 0–5)
HGB BLD-MCNC: 10.5 G/DL (ref 12.1–17)
INR PPP: 1.4 (ref 0.9–1.1)
LDLC SERPL CALC-MCNC: 26.8 MG/DL (ref 0–100)
LIPID PROFILE,FLP: NORMAL
MAGNESIUM SERPL-MCNC: 2.1 MG/DL (ref 1.6–2.4)
MCH RBC QN AUTO: 32.3 PG (ref 26–34)
MCHC RBC AUTO-ENTMCNC: 31.1 G/DL (ref 30–36.5)
MCV RBC AUTO: 104 FL (ref 80–99)
NRBC # BLD: 0.15 K/UL (ref 0–0.01)
NRBC BLD-RTO: 1.7 PER 100 WBC
PHOSPHATE SERPL-MCNC: 3.2 MG/DL (ref 2.6–4.7)
PLATELET # BLD AUTO: 319 K/UL (ref 150–400)
PMV BLD AUTO: 10.2 FL (ref 8.9–12.9)
POTASSIUM SERPL-SCNC: 4.9 MMOL/L (ref 3.5–5.1)
PROTHROMBIN TIME: 14 SEC (ref 9–11.1)
Q-T INTERVAL, ECG07: 480 MS
QRS DURATION, ECG06: 160 MS
QTC CALCULATION (BEZET), ECG08: 475 MS
RBC # BLD AUTO: 3.25 M/UL (ref 4.1–5.7)
SODIUM SERPL-SCNC: 139 MMOL/L (ref 136–145)
TRIGL SERPL-MCNC: 46 MG/DL (ref ?–150)
TROPONIN I SERPL-MCNC: 0.09 NG/ML
VENTRICULAR RATE, ECG03: 59 BPM
VLDLC SERPL CALC-MCNC: 9.2 MG/DL
WBC # BLD AUTO: 9 K/UL (ref 4.1–11.1)

## 2018-10-15 PROCEDURE — 80048 BASIC METABOLIC PNL TOTAL CA: CPT | Performed by: INTERNAL MEDICINE

## 2018-10-15 PROCEDURE — 97161 PT EVAL LOW COMPLEX 20 MIN: CPT

## 2018-10-15 PROCEDURE — 36415 COLL VENOUS BLD VENIPUNCTURE: CPT | Performed by: INTERNAL MEDICINE

## 2018-10-15 PROCEDURE — 97165 OT EVAL LOW COMPLEX 30 MIN: CPT

## 2018-10-15 PROCEDURE — 96376 TX/PRO/DX INJ SAME DRUG ADON: CPT

## 2018-10-15 PROCEDURE — 95816 EEG AWAKE AND DROWSY: CPT | Performed by: PSYCHIATRY & NEUROLOGY

## 2018-10-15 PROCEDURE — 96372 THER/PROPH/DIAG INJ SC/IM: CPT

## 2018-10-15 PROCEDURE — 97116 GAIT TRAINING THERAPY: CPT

## 2018-10-15 PROCEDURE — G8979 MOBILITY GOAL STATUS: HCPCS | Performed by: PHYSICAL THERAPIST

## 2018-10-15 PROCEDURE — 85027 COMPLETE CBC AUTOMATED: CPT | Performed by: INTERNAL MEDICINE

## 2018-10-15 PROCEDURE — 70450 CT HEAD/BRAIN W/O DYE: CPT

## 2018-10-15 PROCEDURE — 80061 LIPID PANEL: CPT | Performed by: INTERNAL MEDICINE

## 2018-10-15 PROCEDURE — 97535 SELF CARE MNGMENT TRAINING: CPT

## 2018-10-15 PROCEDURE — G8980 MOBILITY D/C STATUS: HCPCS | Performed by: PHYSICAL THERAPIST

## 2018-10-15 PROCEDURE — G8989 SELF CARE D/C STATUS: HCPCS | Performed by: PHYSICAL THERAPIST

## 2018-10-15 PROCEDURE — 93880 EXTRACRANIAL BILAT STUDY: CPT

## 2018-10-15 PROCEDURE — 85610 PROTHROMBIN TIME: CPT | Performed by: INTERNAL MEDICINE

## 2018-10-15 PROCEDURE — 93306 TTE W/DOPPLER COMPLETE: CPT

## 2018-10-15 PROCEDURE — 74011250637 HC RX REV CODE- 250/637: Performed by: INTERNAL MEDICINE

## 2018-10-15 PROCEDURE — 84100 ASSAY OF PHOSPHORUS: CPT | Performed by: INTERNAL MEDICINE

## 2018-10-15 PROCEDURE — G8988 SELF CARE GOAL STATUS: HCPCS | Performed by: PHYSICAL THERAPIST

## 2018-10-15 PROCEDURE — 83036 HEMOGLOBIN GLYCOSYLATED A1C: CPT | Performed by: NURSE PRACTITIONER

## 2018-10-15 PROCEDURE — G8978 MOBILITY CURRENT STATUS: HCPCS | Performed by: PHYSICAL THERAPIST

## 2018-10-15 PROCEDURE — 84484 ASSAY OF TROPONIN QUANT: CPT | Performed by: INTERNAL MEDICINE

## 2018-10-15 PROCEDURE — 83735 ASSAY OF MAGNESIUM: CPT | Performed by: INTERNAL MEDICINE

## 2018-10-15 PROCEDURE — 65390000012 HC CONDITION CODE 44 OBSERVATION

## 2018-10-15 PROCEDURE — G8987 SELF CARE CURRENT STATUS: HCPCS | Performed by: PHYSICAL THERAPIST

## 2018-10-15 RX ORDER — WARFARIN 3 MG/1
3 TABLET ORAL DAILY
Qty: 7 TAB | Refills: 0 | Status: SHIPPED | OUTPATIENT
Start: 2018-10-15 | End: 2018-10-22

## 2018-10-15 RX ORDER — FUROSEMIDE 40 MG/1
80 TABLET ORAL DAILY
Status: DISCONTINUED | OUTPATIENT
Start: 2018-10-15 | End: 2018-10-15 | Stop reason: HOSPADM

## 2018-10-15 RX ADMIN — FUROSEMIDE 80 MG: 40 TABLET ORAL at 12:43

## 2018-10-15 RX ADMIN — Medication 10 ML: at 06:00

## 2018-10-15 RX ADMIN — FERROUS SULFATE TAB 325 MG (65 MG ELEMENTAL FE) 325 MG: 325 (65 FE) TAB at 08:47

## 2018-10-15 RX ADMIN — LOPERAMIDE HYDROCHLORIDE 2 MG: 2 CAPSULE ORAL at 08:48

## 2018-10-15 RX ADMIN — Medication 10 ML: at 13:46

## 2018-10-15 RX ADMIN — FAMOTIDINE 20 MG: 20 TABLET ORAL at 08:48

## 2018-10-15 NOTE — PROGRESS NOTES
[facility] Medical Progress Note NAME: Kirby Crowell :  10/6/1924 MRM:  [id number] Date/Time: 10/15/2018  9:23 AM 
 
  
Assessment and Plan:  
 
Hand weakness / TIA - May be embolic in setting of chronic afib with inadequate anticoagulation. Consulted Neurology. Awaiting MRI/MRA, carotids, echo, lipids. Ordered PT/OT to see, though symptoms have resolved and I doubt he has any needs, no need for Speech  
  
Chronic atrial fibrillation / Chronic anticoagulation - enoxaparin now, I plan to transition back to coumadin if okay with Cardiology and GI 
 
EDWIN (obstructive sleep apnea) - Dx 15 years ago, does not use CPAP, counseled this increases risk of stroke 
  
Elevated troponin - Presumed strain. Check echocardiogram, Cardiology to see as above Colon cancer - reportedly CATIE, outpatient follow up 
  
Abnormal chest x-ray - effusion and ASDz c/w CHF vs pneumonia, but asymptomatic and sats 94% on RA at rest. ECHO. Consult Pulmonary, perhaps a non-contrast CT will be helpful but defer imaging choice to them 
  
CKD 3 - Cr higher than last measures, progression vs dehydration. Hold Lasix for now. But due to above, hold IVF also. Subjective: Chief Complaint:  Feels at baseline. No current complaints ROS: 
(bold if positive, if negative) Tolerating PT  Tolerating Diet Objective:  
 
Last 24hrs VS reviewed since prior progress note. Most recent are: 
 
Visit Vitals  /78 (BP 1 Location: Left arm, BP Patient Position: At rest)  Pulse 62  Temp 98.1 °F (36.7 °C)  Resp 16  
 Ht 5' 10\" (1.778 m)  Wt 64.5 kg (142 lb 4.8 oz)  SpO2 94%  BMI 20.42 kg/m2 SpO2 Readings from Last 6 Encounters:  
10/15/18 94% 08/10/18 95% 18 96% 16 95% 04/25/15 97% 13 98% Intake/Output Summary (Last 24 hours) at 10/15/18 6305 Last data filed at 10/15/18 7589 Gross per 24 hour Intake              500 ml Output              700 ml Net             -200 ml Physical Exam: 
 
Gen:  Thin, frail HEENT:  Pink conjunctivae, PERRL, hearing intact to voice, moist mucous membranes Neck:  Supple, without masses, thyroid non-tender Resp:  No accessory muscle use, clear breath sounds without wheezes rales or rhonchi 
Card:  No murmurs, normal S1, S2 without thrills, bruits or peripheral edema Abd:  Soft, non-tender, non-distended, normoactive bowel sounds are present, no mass Lymph:  No cervical or inguinal adenopathy Musc:  No cyanosis or clubbing Skin:  No rashes or ulcers, skin turgor is good Neuro:  Cranial nerves are grossly intact, mild general motor weakness, follows commands appropriately Psych:  Good insight, oriented to person, place and time, alert Telemetry reviewed:   normal sinus rhythm 
__________________________________________________________________ Medications Reviewed: (see below) Medications:  
 
Current Facility-Administered Medications Medication Dose Route Frequency  influenza vaccine 2018-19 (6 mos+)(PF) (FLUARIX QUAD/FLULAVAL QUAD) injection 0.5 mL  0.5 mL IntraMUSCular PRIOR TO DISCHARGE  labetalol (NORMODYNE;TRANDATE) injection 10 mg  10 mg IntraVENous Q4H PRN  
 sodium chloride (NS) flush 5-10 mL  5-10 mL IntraVENous Q8H  
 sodium chloride (NS) flush 5-10 mL  5-10 mL IntraVENous PRN  
 acetaminophen (TYLENOL) tablet 650 mg  650 mg Oral Q4H PRN  
 oxyCODONE-acetaminophen (PERCOCET) 5-325 mg per tablet 1 Tab  1 Tab Oral Q4H PRN  
 HYDROmorphone (PF) (DILAUDID) injection 0.5 mg  0.5 mg IntraVENous Q4H PRN  prochlorperazine (COMPAZINE) injection 10 mg  10 mg IntraVENous Q6H PRN  
 zolpidem (AMBIEN) tablet 5 mg  5 mg Oral QHS PRN  cholestyramine-aspartame (QUESTRAN LIGHT) packet 4 g  1 Packet Oral QHS  ferrous sulfate tablet 325 mg  325 mg Oral BID  famotidine (PEPCID) tablet 20 mg  20 mg Oral DAILY  montelukast (SINGULAIR) tablet 10 mg  10 mg Oral QHS  loperamide (IMODIUM) capsule 2 mg  2 mg Oral DAILY  Warfarin: pharmacy to dose    Other Rx Dosing/Monitoring  enoxaparin (LOVENOX) injection 60 mg  60 mg SubCUTAneous Q24H Lab Data Reviewed: (see below) Lab Review:  
 
Recent Labs 10/15/18 
 0319  10/14/18 [address] WBC  9.0  7.4 HGB  10.5*  10.4* HCT  33.8*  33.1*  
PLT  319  297 Recent Labs 10/15/18 
 0319  10/14/18 [address] NA  139  141  
K  4.9  4.0  
CL  104  104 CO2  28  32 GLU  83  84 BUN  29*  29* CREA  1.70*  1.79* CA  8.6  8.8 MG  2.1   --   
PHOS  3.2   --   
ALB   --   3.0* TBILI   --   0.8 SGOT   --   24 ALT   --   25 INR  1.4*  1.3* No results found for: Jennifer Cline No results for input(s): PH, PCO2, PO2, HCO3, FIO2 in the last 72 hours. Recent Labs 10/15/18 
 0319  10/14/18 [address] INR  1.4*  1.3* All Micro Results None I have reviewed notes of prior 24hr. Other pertinent lab: none Total time spent with patient: 45 Minutes Care Plan discussed with: Patient, Care Manager, Nursing Staff, Consultant/Specialist and >50% of time spent in counseling and coordination of care Discussed:  Care Plan and D/C Planning Prophylaxis:  Coumadin and H2B/PPI Disposition:  Home w/Family 
        
___________________________________________________ Attending Physician: Livier Delaney MD

## 2018-10-15 NOTE — PROGRESS NOTES
Problem: Falls - Risk of 
Goal: *Absence of Falls Document Snow Malagon Fall Risk and appropriate interventions in the flowsheet. Outcome: Progressing Towards Goal 
Fall Risk Interventions: 
  
 
  
 
Medication Interventions: Bed/chair exit alarm

## 2018-10-15 NOTE — PROGRESS NOTES
[facility]acy Dosing Services: 10/15/2018 Consult for Warfarin Dosing by Pharmacy by Dr. Kyle Boss Consult provided for this 80 y.o.  male , for indication of Atrial Fibrillation. Day of Therapy: resuming from home, patient has been off warfarin since 10/6 due to suspected GI bleeding/blood in stool. Prior to holding therapy patient was on 7mg daily (5mg tab +2mg tab). Dose to achieve an INR goal of 2-3 Order entered for Warfarin 3 mg to be given today at 18:00. Therapy resumed cautiously due to history of bleed. Significant drug interactions: enoxaparin bridging Previous dose given Warfarin 2.5 mg on 10/14/2018 PT/INR Lab Results Component Value Date/Time INR 1.4 (H) 10/15/2018 03:19 AM  
  
Platelets Lab Results Component Value Date/Time PLATELET 012 52/12/2220 03:19 AM  
  
H/H Lab Results Component Value Date/Time HGB 10.5 (L) 10/15/2018 03:19 AM  
  
 
Pharmacist will follow daily and will provide subsequent Warfarin dosing based on clinical status.  
Matilde Matos, Pharmacist

## 2018-10-15 NOTE — PROGRESS NOTES
Problem: Falls - Risk of 
Goal: *Absence of Falls Document Hayley Dubon Fall Risk and appropriate interventions in the flowsheet. Outcome: Progressing Towards Goal 
Fall Risk Interventions: 
  
 
  
 
Medication Interventions: Bed/chair exit alarm. Juliann Lv 0700- Bedside and Verbal shift change report given to Bear Burks RN (oncoming nurse) by Timbo Kurtz RN  (offgoing nurse). Report included the following information SBAR, Kardex and Recent Results. .. Stroke Education provided to patient and the following topics were discussed 1. Patients personal risk factors for stroke are atrial fibrillation 2. Warning signs of Stroke: * Sudden numbness or weakness of the face, arm or leg, especially on one side of The body * Sudden confusion, trouble speaking or understanding * Sudden trouble seeing in one or both eyes * Sudden trouble walking, dizziness, loss of balance or coordination * Sudden severe headache with no known cause 3. Importance of activation Emergency Medical Services ( 9-1-1 ) immediately if experience any warning signs of stroke. 4. Be sure and schedule a follow-up appointment with your primary care doctor or any specialists as instructed. 5. You must take medicine every day to treat your risk factors for stroke. Be sure to take your medicines exactly as your doctor tells you: no more, no less. Know what your medicines are for , what they do. Anti-thrombotics /anticoagulants can help prevent strokes. You are taking the following medicine(s)  inj enoxaparin sq, caumidinepo. 6.  Smoking and second-hand smoke greatly increase your risk of stroke, cardiovascular disease and death. Smoking history ended year 5. 
 
7. Information provided was Stroke Handouts 8. Documentation of teaching completed in Patient Education Activity and on Care Plan with teaching response noted? Yes. . 
 
1120- EEG. . 
 
1230- Back to floor. Juliannricardo Awan 1600- I have reviewed discharge instructions with the patient and caregiver. The patient and caregiver verbalized understanding. Parminder Braun Discharged the pt via [facility]. Parminder Braun

## 2018-10-15 NOTE — CONSULTS
Name: James Charles: [address]   : 10/6/1924 Admit Date: 10/14/2018   Phone: [phone]  Room: [facility]/01   PCP: Anna Vital MD  MRN: [id number]   Date: 10/15/2018  Code: Full Code        HPI:      Chart and notes reviewed. Data reviewed. I review the patient's current medications in the medical record at each encounter.  I have evaluated and examined the patient. 9:15 AM       History was obtained from patient. I was asked by Angelo Mcguire MD to see Jerilynladi Miller in consultation for a chief complaint of abnormal chest x-ray. History of Present Illness:  Marcin Gallagher is a very pleasant, 80year old gentleman that presented to [facility] yesterday after his right hand stopped working at home. He reports that he has been more anemic recently and thus was taken off Coumadin 2-3 weeks ago. Yesterday after using the bathroom, he couldn't button his pants because his hand would not function at all. His CT was negative and today his symptoms are completely resolved and he feels that he has full functioning back in his hand. Pulmonology has been consulted to review his chest imaging. He tells me that he is always short of breath with exertion, but this has been at baseline without any changes in many years. Reports baseline edema in LEs: takes 80mg of Lasix daily and has been compliant with regimen. He denies history of lung disease. Has sleep apnea but does not use CPAP at night, stating that he didn't tolerate it well. He can climb a flight of steps without becoming too short of breath. Denies significant cough or sputum production. Denies fever or chills. Denies sick contacts. He smoked for 50 years and quit in the 1970s, reports he smoked \"whatever I could get my hands on. \"  He sees Gayle Isaac of Cardiology: unsure of if he has had recent chest imaging done. He lives at home with his wife and uses a cane to mobilize. Images:  Personally reviewed.     CXR: Airspace disease and right pleural effusion favor pulmonary edema over infection. WBC 9  Hgb 10.5  Plts 319  INR 1.3  Creat 1.7 (1.31 in 2013)  CTA head negative  ECHO 2013:  EF 60%, LA and RA markedly dilated  Pro-BNP 95425  Trop .09    Past Medical History:   Diagnosis Date    Arthritis     Cancer ([facility] [address]     luekemia- just completed 8 days of chemotherapy    Cancer ([facility]     Hairy cell leukemia    Cancer ([facility]     colon    Chronic atrial fibrillation ([facility] 10/14/2018    GERD (gastroesophageal reflux disease)     Ill-defined condition     anemia    Murmur     Other ill-defined conditions(799.89)     De Leon's esophagus    Unspecified sleep apnea     doesn't use c-pap.        Past Surgical History:   Procedure Laterality Date    ABDOMEN SURGERY PROC UNLISTED      colon resection    COLONOSCOPY N/A 6/22/2016    COLONOSCOPY performed by Norris Villalobos [facility] at [facility]    HX ORTHOPAEDIC  2002    broken right leg -repair     HX OTHER SURGICAL      hemmrhoidectomy    HX TONSILLECTOMY         Family History   Problem Relation Age of Onset    Cancer Mother     Cancer Brother        Social History   Substance Use Topics    Smoking status: Former Smoker     Packs/day: 2.00     Quit date: 11/8/1980    Smokeless tobacco: Never Used    Alcohol use 0.5 oz/week     1 Glasses of wine per week      Comment: glass with dinner nightly       No Known Allergies    Current Facility-Administered Medications   Medication Dose Route Frequency    influenza vaccine 2018-19 (6 mos+)(PF) (FLUARIX QUAD/FLULAVAL QUAD) injection 0.5 mL  0.5 mL IntraMUSCular PRIOR TO DISCHARGE    labetalol (NORMODYNE;TRANDATE) injection 10 mg  10 mg IntraVENous Q4H PRN    sodium chloride (NS) flush 5-10 mL  5-10 mL IntraVENous Q8H    sodium chloride (NS) flush 5-10 mL  5-10 mL IntraVENous PRN    acetaminophen (TYLENOL) tablet 650 mg  650 mg Oral Q4H PRN    oxyCODONE-acetaminophen (PERCOCET) 5-325 mg per tablet 1 Tab  1 Tab Oral Q4H PRN    HYDROmorphone (PF) (DILAUDID) injection 0.5 mg  0.5 mg IntraVENous Q4H PRN    prochlorperazine (COMPAZINE) injection 10 mg  10 mg IntraVENous Q6H PRN    zolpidem (AMBIEN) tablet 5 mg  5 mg Oral QHS PRN    cholestyramine-aspartame (QUESTRAN LIGHT) packet 4 g  1 Packet Oral QHS    ferrous sulfate tablet 325 mg  325 mg Oral BID    famotidine (PEPCID) tablet 20 mg  20 mg Oral DAILY    montelukast (SINGULAIR) tablet 10 mg  10 mg Oral QHS    loperamide (IMODIUM) capsule 2 mg  2 mg Oral DAILY    Warfarin: pharmacy to dose    Other Rx Dosing/Monitoring    enoxaparin (LOVENOX) injection 60 mg  60 mg SubCUTAneous Q24H         REVIEW OF SYSTEMS   Negative except as stated in the HPI. Physical Exam:   Visit Vitals    /78 (BP 1 Location: Left arm, BP Patient Position: At rest)    Pulse 62    Temp 98.1 °F (36.7 °C)    Resp 16    Ht 5' 10\" (1.778 m)    Wt 64.5 kg (142 lb 4.8 oz)    SpO2 94%    BMI 20.42 kg/m2       General:  Alert, cooperative, no distress, appears stated age. Head:  Normocephalic, without obvious abnormality, atraumatic. Eyes:  Conjunctivae/corneas clear. Nose: Nares normal. Septum midline. Mucosa normal.    Throat: Lips, mucosa, and tongue normal. Teeth and gums normal.   Neck: Supple, symmetrical, trachea midline, no adenopathy   Lungs:   Few scattered crackles   Chest wall:  No tenderness or deformity. Heart:  Quiet, S1 S2 normal   Abdomen:   Soft, non-tender. Bowel sounds normal.    Extremities: Extremities normal, atraumatic, no cyanosis, trace LE edema   Pulses: 2+ and symmetric all extremities.    Skin: Skin color, texture, turgor normal. No rashes or lesions   Lymph nodes: Cervical, supraclavicular, and axillary nodes normal.   Neurologic: Grossly nonfocal       Lab Results   Component Value Date/Time    Sodium 139 10/15/2018 03:19 AM    Potassium 4.9 10/15/2018 03:19 AM    Chloride 104 10/15/2018 03:19 AM    CO2 28 10/15/2018 03:19 AM    BUN 29 (H) 10/15/2018 03:19 AM    Creatinine 1.70 (H) 10/15/2018 03:19 AM    Glucose 83 10/15/2018 03:19 AM    Calcium 8.6 10/15/2018 03:19 AM    Magnesium 2.1 10/15/2018 03:19 AM    Phosphorus 3.2 10/15/2018 03:19 AM    Lactic acid 0.8 08/18/2013 12:12 PM       Lab Results   Component Value Date/Time    WBC 9.0 10/15/2018 03:19 AM    HGB 10.5 (L) 10/15/2018 03:19 AM    PLATELET 250 21/77/9641 03:19 AM    .0 (H) 10/15/2018 03:19 AM       Lab Results   Component Value Date/Time    INR 1.4 (H) 10/15/2018 03:19 AM    aPTT 32.8 (H) 10/14/2018 02:37 PM    AST (SGOT) 24 10/14/2018 02:37 PM    Alk. phosphatase 175 (H) 10/14/2018 02:37 PM    Protein, total 6.8 10/14/2018 02:37 PM    Albumin 3.0 (L) 10/14/2018 02:37 PM    Globulin 3.8 10/14/2018 02:37 PM       No results found for: IRON, FE, TIBC, IBCT, PSAT, FERR    No results found for: SR, CRP, NIURKA, ANAIGG, RA, RPR, RPRT, VDRLT, VDRLS, TSH, TSHEXT     No results found for: PH, PHI, PCO2, PCO2I, PO2, PO2I, HCO3, HCO3I, FIO2, FIO2I    Lab Results   Component Value Date/Time    Troponin-I, Qt. 0.09 (H) 10/15/2018 03:19 AM        Lab Results   Component Value Date/Time    Culture result:  11/15/2012 06:05 PM     MRSA NOT PRESENT      Screening of patient nares for MRSA is for surveillance purposes and, if positive, to facilitate isolation considerations in high risk settings. It is not intended for automatic decolonization interventions per se as regimens are not sufficiently effective to warrant routine use.        No results found for: TOXA1, RPR, HBCM, HBSAG, HAAB, HCAB1, HAAT, G6PD, CRYAC, HIVGT, HIVR, HIV1, HIV12, HIVPC, HIVRPI    No results found for: VANCT, CPK    Lab Results   Component Value Date/Time    Color YELLOW/STRAW 08/18/2013 01:45 PM    Appearance CLEAR 08/18/2013 01:45 PM    pH (UA) 5.0 08/18/2013 01:45 PM    Protein TRACE (A) 08/18/2013 01:45 PM    Glucose NEGATIVE  08/18/2013 01:45 PM    Ketone NEGATIVE  08/18/2013 01:45 PM    Bilirubin NEGATIVE 08/18/2013 01:45 PM    Blood LARGE (A) 08/18/2013 01:45 PM    Urobilinogen 0.2 08/18/2013 01:45 PM    Nitrites NEGATIVE  08/18/2013 01:45 PM    Leukocyte Esterase NEGATIVE  08/18/2013 01:45 PM    WBC 0-4 08/18/2013 01:45 PM    RBC 5-10 08/18/2013 01:45 PM    Bacteria NEGATIVE  08/18/2013 01:45 PM       IMPRESSION  · Right Hand Numbess/Weakness: resolved today  · Abnormal Chest Imaging: with pulmonary edema and right pleural effusion, favor this over pneumonia as he is asymptomatic, no leukocytosis or fevers. His Pro-BNP is markedly elevated, as well. · Atrial Fibrillation; previous on AC with Coumadin  · Elevated Troponin  · Anemia  · EDWIN; not compliant with CPAP  · CKD    PLAN  · Neurology following, brain MRI pending. · Would recommend restarting diuresis. Cardiology and Renal both to see, as well. · Okay to have non-contrast CT to evaluate parenchyma if warranted  · Trend troponin  · Trend H&H, has recently been cleared by GI without source of bleed  · DVT Prophylaxis:  therapeutic Lovenox initiated, agree with recent event that he needs to be [facility]     Thank you very much for the consult.       Noé Hernandez NP

## 2018-10-15 NOTE — PROGRESS NOTES
physical Therapy EVALUATION/DISCHARGE Patient: Karina Arora (39 y.o. male) Date: 10/15/2018 Primary Diagnosis: Hand weakness ASSESSMENT : 
Based on the objective data described above, the patient presents with modified independent with ambulation using single point cane  and independent with all functional mobility. Patient at his base line level of function and ambulate short distance at home. Patient  Reviewed all safety precaution and home exercise program with the patient, verbalized understanding, clear to go home per Physical Therapy perspective. Skilled physical therapy is not indicated at this time. PLAN : 
Discharge Recommendations: None Further Equipment Recommendations for Discharge: already has DME's SUBJECTIVE:  
Patient stated ok.  OBJECTIVE DATA SUMMARY:  
HISTORY:   
Past Medical History:  
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   
 colon  Chronic atrial fibrillation ([facility] [address] 10/14/2018  GERD (gastroesophageal reflux disease)  Ill-defined condition   
 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   
 doesn't use c-pap. Past Surgical History:  
Procedure Laterality Date  ABDOMEN SURGERY PROC UNLISTED    
 colon resection  COLONOSCOPY N/A 6/22/2016 COLONOSCOPY performed by Margarita Grant MD at [address]  HX ORTHOPAEDIC  2002  
 broken right leg -repair  HX OTHER SURGICAL    
 hemmrhoidectomy  HX TONSILLECTOMY Prior Level of Function/Home Situation: modified independent with single point cane at home lives with spouse single level house. Personal factors and/or comorbidities impacting plan of care:  
 
Home Situation Home Environment: Private residence # Steps to Enter: 6 One/Two Story Residence: Two story # of Interior Steps: 12 Height of Each Step (in): 1 inches Interior Rails: Both Lift Chair Available: No 
Living Alone: No 
Support Systems: Child(nida), [employer], Family member(s) Patient Expects to be Discharged to[de-identified] Private residence Current DME Used/Available at Home: None Tub or Shower Type: Shower EXAMINATION/PRESENTATION/DECISION MAKING:  
Critical Behavior: 
Neurologic State: Alert Orientation Level: Oriented X4 Cognition: Appropriate decision making, Appropriate for age attention/concentration, Appropriate safety awareness Safety/Judgement: Awareness of environment, Good awareness of safety precautions Hearing: Auditory Auditory Impairment: None Range Of Motion: 
AROM: Within functional limits PROM: Within functional limits Strength:   
Strength: Within functional limits Tone & Sensation:  
  
  
  
  
  
  
  
  
  
   
Coordination: 
Coordination: Within functional limits Vision:  
Tracking: Able to track stimulus in all quadrants w/o difficulty Diplopia: No 
Acuity: Within Defined Limits Corrective Lenses: Glasses Functional Mobility: 
Bed Mobility: 
Rolling: Independent Supine to Sit: Independent Sit to Supine: Independent Scooting: Independent Transfers: 
Sit to Stand: Modified independent Stand to Sit: Modified independent Stand Pivot Transfers: Modified independent Bed to Chair: Modified independent Balance:  
Sitting: Intact Standing: Intact; With support Ambulation/Gait Training: 
Distance (ft): 100 Feet (ft) Assistive Device: Cane, straight;Gait belt Ambulation - Level of Assistance: Modified independent Gait Description (WDL): Within defined limits Gait Abnormalities: Path deviations Base of Support: Widened Speed/Maren: Pace decreased (<100 feet/min) Therapeutic Exercises:  
 Instructed patient to continue active range of motion exercise on both legs while up on chair or on bed. Functional Measure: 
Barthel Index: 
 
Bathin Bladder: 10 Bowels: 10 
Groomin Dressing: 10 Feeding: 10 Mobility: 10 Stairs: 0 Toilet Use: 10 Transfer (Bed to Chair and Back): 10 Total: 80 Barthel and G-code impairment scale: 
Percentage of impairment CH 
0% CI 
1-19% CJ 
20-39% CK 
40-59% CL 
60-79% CM 
80-99% CN 
100% Barthel Score 0-100 100 99-80 79-60 59-40 20-39 1-19 
 0 Barthel Score 0-20 20 17-19 13-16 9-12 5-8 1-4 0 The Barthel ADL Index: Guidelines 1. The index should be used as a record of what a patient does, not as a record of what a patient could do. 2. The main aim is to establish degree of independence from any help, physical or verbal, however minor and for whatever reason. 3. The need for supervision renders the patient not independent. 4. A patient's performance should be established using the best available evidence. Asking the patient, friends/relatives and nurses are the usual sources, but direct observation and common sense are also important. However direct testing is not needed. 5. Usually the patient's performance over the preceding 24-48 hours is important, but occasionally longer periods will be relevant. 6. Middle categories imply that the patient supplies over 50 per cent of the effort. 7. Use of aids to be independent is allowed. Emily Gallagher., Barthel, D.W. (3247). Functional evaluation: the Barthel Index. [facility] (14)2. ROQUE Taylor, Iraida Giron., Karen Rolle., [place], [address] [place] [address] (). Measuring the change indisability after inpatient rehabilitation; comparison of the responsiveness of the Barthel Index and Functional [place] Measure. Journal of Neurology, Neurosurgery, and Psychiatry, 66(4), 618-478.  
CARLOTTA Honeycutt, JYOTI Ordonez, & Dagoberto Rodas M.A. (2004.) Assessment of post-stroke quality of life in cost-effectiveness studies: The usefulness of the Barthel Index and the EuroQoL-5D. [facility], 13, 019-55 G codes: In compliance with CMSs Claims Based Outcome Reporting, the following G-code set was chosen for this patient based on their primary functional limitation being treated: The outcome measure chosen to determine the severity of the functional limitation was the barthel with a score of 80/100 which was correlated with the impairment scale. ? Mobility - Walking and Moving Around:  
  - CURRENT STATUS: CI - 1%-19% impaired, limited or restricted  - GOAL STATUS: CI - 1%-19% impaired, limited or restricted  - D/C STATUS:  CI - 1%-19% impaired, limited or restricted Physical Therapy Evaluation Charge Determination History Examination Presentation Decision-Making LOW Complexity : Zero comorbidities / personal factors that will impact the outcome / POC LOW Complexity : 1-2 Standardized tests and measures addressing body structure, function, activity limitation and / or participation in recreation  LOW Complexity : Stable, uncomplicated  Other outcome measures barthel  LOW Based on the above components, the patient evaluation is determined to be of the following complexity level: LOW Pain: 
Pain Scale 1: Numeric (0 - 10) Pain Intensity 1: 0 Activity Tolerance:  
Good. Please refer to the flowsheet for vital signs taken during this treatment. After treatment:  
[x]   Patient left in no apparent distress sitting up in chair 
[]   Patient left in no apparent distress in bed 
[x]   Call bell left within reach [x]   Nursing notified 
[]   Caregiver present 
[]   Bed alarm activated COMMUNICATION/EDUCATION:  
Communication/Collaboration: 
[x]   Fall prevention education was provided and the patient/caregiver indicated understanding. [x]   Patient/family have participated as able and agree with findings and recommendations. []   Patient is unable to participate in plan of care at this time. Findings and recommendations were discussed with: Occupational Therapist, Registered Nurse and patient Thank you for this referral. 
Lee Sanz, PT,WCC. Time Calculation: 25 mins

## 2018-10-15 NOTE — PROCEDURES
[facility]  *** FINAL REPORT ***    Name: Omer Fernandez  MRN: [id number]    Inpatient  : 06 Oct 1924  HIS Order #: [id number]  [address] [facility] Visit #: [id number]  Date: 15 Oct 2018    TYPE OF TEST: Cerebrovascular Duplex    REASON FOR TEST  Transient ischemic attacks    Right Carotid:-             Proximal               Mid                 Distal  cm/s  Systolic  Diastolic  Systolic  Diastolic  Systolic  Diastolic  CCA:     07.3       9.0                            57.2      11.0  Bulb:  ECA:     51.0       0.0  ICA:     59.5       8.3       64.2      13.5       83.1      20.4  ICA/CCA:  1.0       0.8    ICA Stenosis: <50%    Right Vertebral:-  Finding: Antegrade  Sys:       49.0  Erma:       11.6    Right Subclavian:    Left Carotid:-            Proximal                Mid                 Distal  cm/s  Systolic  Diastolic  Systolic  Diastolic  Systolic  Diastolic  CCA:     87.3       8.4                            54.6       7.5  Bulb:  ECA:    103.7       0.0  ICA:     55.6      10.5       68.3      12.4       66.6      13.8  ICA/CCA:  1.0       1.4    ICA Stenosis: <50%    Left Vertebral:-  Finding: Antegrade  Sys:       51.1  Erma:       10.1    Left Subclavian:    INTERPRETATION/FINDINGS  PROCEDURE:  Evaluation of the extracranial cerebrovascular arteries  with ultrasound (B-mode imaging, pulsed Doppler, color Doppler). Includes the common carotid, internal carotid, external carotid, and  vertebral arteries. FINDINGS:  Carotid plaque was seen in the bilateral carotid bulb with no  hemodynamic changes. IMPRESSION: Findings are consistent with 0-49% stenosis of the right  internal carotid and 0-49% stenosis of the left internal carotid. Vertebrals are patent with antegrade flow. ADDITIONAL COMMENTS    I have personally reviewed the data relevant to the interpretation of  this  study. TECHNOLOGIST: Akiko Novak  Signed: 10/15/2018 03:34 PM    PHYSICIAN: Emmanuel Benjamin.  Oliva Bennett MD  Signed: 10/16/2018 04:07 PM

## 2018-10-15 NOTE — PROCEDURES
[facility]  EEG    Helen Reddy  MR#: [id number]  : 10/06/1924  ACCOUNT #: [de-identified]   DATE OF SERVICE: 10/15/2018    REQUESTING PHYSICIAN:  Govind Velazquez MD     EEG is requested on this 80year-old with paroxysmal and question TIA to evaluate for epileptiform abnormality. MEDICATION LIST:  Lasix, Singulair, probiotic. This tracing is obtained during the awake state. During wakefulness, the background consists of diffuse low voltage fast frequency beta wave activity. Hyperventilation not performed due to his age and history. Intermittent photic stimulation, little alters the tracing. Sleep is not obtained. INTERPRETATION:  This EEG recorded during the awake state is normal.  No epileptiform abnormalities are seen.       Mehran Katz MD       SLE / VN  D: 10/15/2018 15:37     T: 10/15/2018 19:28  JOB #: [id number]

## 2018-10-15 NOTE — DISCHARGE INSTRUCTIONS
Patient Discharge Instructions    Donnel Crigler / [id number] : 10/6/1924    Admitted 10/14/2018 Discharged: 10/15/2018     Primary Diagnoses  Problem List as of 10/15/2018  Date Reviewed: 10/15/2018          Codes Class Noted - Resolved   * (Principal)Hand weakness   Chronic atrial fibrillation (HCC) (Chronic)   EDWIN (obstructive sleep apnea) (Chronic)   Chronic anticoagulation   Elevated troponin   Abnormal chest x-ray   CKD (chronic kidney disease) stage 3, GFR 30-59 ml/min (HCC) (Chronic)   Colon cancer (HCC) (Chronic)          Take Home Medications     · It is important that you take the medication exactly as they are prescribed. · Keep your medication in the bottles provided by the pharmacist and keep a list of the medication names, dosages, and times to be taken in your wallet. · Do not take other medications without consulting your doctor. What to do at Home    Recommended diet: Cardiac Diet    Recommended activity: Activity as tolerated    If you experience worse symptoms, please follow up with Neurology. Follow-up with your PCP in a few weeks        Information obtained by :  I understand that if any problems occur once I am at home I am to contact my physician. I understand and acknowledge receipt of the instructions indicated above.                                                                                                                                            Physician's or R.N.'s Signature                                                                  Date/Time                                                                                                                                              Patient or Representative Signature                                                          Date/Time

## 2018-10-15 NOTE — DISCHARGE SUMMARY
Physician Discharge Summary     Patient ID:  Patrick Saab  [id number]  10 y.o.  10/6/1924    Admit date: 10/14/2018    Discharge date and time: 10/15/2018    Admission Diagnoses: Hand weakness    Discharge Diagnoses:    Principal Diagnosis   Hand weakness                                             Other Diagnoses    Colon cancer ([facility] (11/15/2012)    Chronic atrial fibrillation ([facility] (10/14/2018)    EDWIN (obstructive sleep apnea) (10/14/2018)    Chronic anticoagulation (10/14/2018)    Elevated troponin (10/14/2018)    Abnormal chest x-ray (10/14/2018)    CKD (chronic kidney disease) stage 3, GFR 30-59 ml/min ([facility]) (10/14/2018)     Hospital Course:  Hand weakness / TIA - Symptoms resolved. I doubt embolic CVA, even in setting of chronic afib with inadequate anticoagulation. Consulted Neurology. Cannot lay flat for MRI/MRA, awaiting carotids. LDL low on lipids. ECHO results noted blow. No needs for PT/OT nor Speech. Resume coumadin. DC home      Chronic atrial fibrillation / Chronic anticoagulation - Transition back to coumadin based on advice of Cardiology     EDWIN (obstructive sleep apnea) - Dx 15 years ago, does not use CPAP, counseled this increases risk of stroke, and heart failure      Right heart failure / Elevated troponin - ECHO shows moderate R heart systolic failure with regurgitation, likely related to pulm HTN and untreated EDWIN, Cardiology has cleared him to DC     Colon cancer - reportedly CATIE, outpatient follow up      Abnormal chest x-ray - effusion and ASDz c/w CHF vs pneumonia, but asymptomatic and sats 94% on RA at rest.  Consulted Pulmonary, who recommend just follow up xray in 6-8 weeks.      CKD 3 - Cr higher than last measures, progression vs dehydration. Resume Lasix.     PCP: Dwight Trujillo MD    Consults: Cardiology, Pulmonary/Intensive care and Neurology    Significant Diagnostic Studies: See Hospital Course    Discharged home in improved condition.     Discharge Exam:   /78 (BP 1 Location: Left arm, BP Patient Position: At rest)    Pulse 62    Temp 98.1 °F (36.7 °C)    Resp 16    Ht 5' 10\" (1.778 m)    Wt 64.5 kg (142 lb 4.8 oz)    SpO2 94%    BMI 20.42 kg/m2      Gen:  Thin, frail  HEENT:  Pink conjunctivae, PERRL, hearing intact to voice, moist mucous membranes  Neck:  Supple, without masses, thyroid non-tender  Resp:  No accessory muscle use, clear breath sounds without wheezes rales or rhonchi  Card:  No murmurs, normal S1, S2 without thrills, bruits or peripheral edema  Abd:  Soft, non-tender, non-distended, normoactive bowel sounds are present, no mass  Lymph:  No cervical or inguinal adenopathy  Musc:  No cyanosis or clubbing  Skin:  No rashes or ulcers, skin turgor is good  Neuro:  Cranial nerves are grossly intact, mild general motor weakness, follows commands appropriately  Psych:  Good insight, oriented to person, place and time, alert    Patient Instructions:   Current Discharge Medication List      START taking these medications    Details   warfarin (COUMADIN) 3 mg tablet Take 1 Tab by mouth daily for 7 doses. Indications: PREVENT THROMBOEMBOLISM IN CHRONIC ATRIAL FIBRILLATION  Qty: 7 Tab, Refills: 0         CONTINUE these medications which have NOT CHANGED    Details   ferrous sulfate 325 mg (65 mg iron) tablet Take 325 mg by mouth two (2) times a day. raNITIdine (ZANTAC) 150 mg tablet Take 150 mg by mouth two (2) times a day. cholestyramine (QUESTRAN) 4 gram packet Take 1 Packet by mouth nightly. loperamide (IMODIUM) 2 mg capsule Take 2 mg by mouth daily. furosemide (LASIX) 40 mg tablet Take 80 mg by mouth daily. B.infantis-B.ani-B.long-B.bifi (PROBIOTIC 4X) 10-15 mg TbEC Take 1 Cap by mouth daily. vitamin e (E GEMS) 1,000 unit capsule Take 1,000 Units by mouth daily. pyridoxine, vitamin B6, (VITAMIN B-6) 100 mg tablet Take 100 mg by mouth daily. montelukast (SINGULAIR) 10 mg tablet Take 10 mg by mouth nightly. Activity: Activity as tolerated  Diet: Cardiac Diet and Low fat, Low cholesterol  Wound Care: None needed    Follow-up with your PCP and CVA clinic in a few weeks.   Follow-up tests/labs - INR check Friday with Dr Kath Franklin or Dr Tyler Cash    Signed:  Elio Lester MD  10/15/2018  3:12 PM

## 2018-10-15 NOTE — CONSULTS
[address] [place] [address]. [address] [place] [address] NP  [phone]                    GASTROENTEROLOGY CONSULTATION NOTE              NAME:  Cherise Concepcion   :   10/6/1924   MRN:   [id number]       Referring Physician:   Dr. Kehinde López Date:   10/15/2018 2:25 PM    Chief Complaint:    Right Hand Weakness     History of Present Illness:    Patient is a 80 y. o. who presented to the ER yesterday for acute onset right hand weakness. He noticed it after he went to the bath room. He described the event as \"my hand went dead. \"  He tried to squeeze an exercise ball and was unable to move it. He explains that by the time he arrived in the ER his hand was \"80%\" better. He is being followed by the neurology service for likely TIA. GI was asked to weigh in on risks for bleeding with restarting anticoagulation. 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 PSH:  Past Surgical History:   Procedure Laterality Date    ABDOMEN SURGERY PROC UNLISTED      colon resection    COLONOSCOPY N/A 2016    COLONOSCOPY performed by David Key MD at [address] HX ORTHOPAEDIC  2002    broken right leg -repair     HX OTHER SURGICAL      hemmrhoidectomy    HX TONSILLECTOMY         Allergies:  No Known Allergies    Home Medications:  Prior to Admission Medications   Prescriptions Last Dose Informant Patient Reported? Taking? B.infantis-B.ani-B.long-B.bifi (PROBIOTIC 4X) 10-15 mg TbEC 10/14/2018 at AM Self Yes Yes   Sig: Take 1 Cap by mouth daily.    cholestyramine (QUESTRAN) 4 gram packet 10/13/2018 at PM Self Yes Yes   Sig: Take 1 Packet by mouth nightly. ferrous sulfate 325 mg (65 mg iron) tablet 10/14/2018 at AM Self Yes Yes   Sig: Take 325 mg by mouth two (2) times a day. furosemide (LASIX) 40 mg tablet 10/14/2018 at AM Self Yes Yes   Sig: Take 80 mg by mouth daily. loperamide (IMODIUM) 2 mg capsule 10/14/2018 at AM Self Yes Yes   Sig: Take 2 mg by mouth daily. montelukast (SINGULAIR) 10 mg tablet 10/13/2018 at PM Self Yes Yes   Sig: Take 10 mg by mouth nightly. pyridoxine, vitamin B6, (VITAMIN B-6) 100 mg tablet 10/14/2018 at AM Self Yes Yes   Sig: Take 100 mg by mouth daily. raNITIdine (ZANTAC) 150 mg tablet 10/14/2018 at AM Self Yes Yes   Sig: Take 150 mg by mouth two (2) times a day. vitamin e (E GEMS) 1,000 unit capsule 10/14/2018 at AM Self Yes Yes   Sig: Take 1,000 Units by mouth daily.       Facility-Administered Medications: None       Hospital Medications:  Current Facility-Administered Medications   Medication Dose Route Frequency    influenza vaccine 2018-19 (6 mos+)(PF) (FLUARIX QUAD/FLULAVAL QUAD) injection 0.5 mL  0.5 mL IntraMUSCular PRIOR TO DISCHARGE    furosemide (LASIX) tablet 80 mg  80 mg Oral DAILY    warfarin (COUMADIN) tablet 3 mg  3 mg Oral ONCE    labetalol (NORMODYNE;TRANDATE) injection 10 mg  10 mg IntraVENous Q4H PRN    sodium chloride (NS) flush 5-10 mL  5-10 mL IntraVENous Q8H    sodium chloride (NS) flush 5-10 mL  5-10 mL IntraVENous PRN    acetaminophen (TYLENOL) tablet 650 mg  650 mg Oral Q4H PRN    oxyCODONE-acetaminophen (PERCOCET) 5-325 mg per tablet 1 Tab  1 Tab Oral Q4H PRN    HYDROmorphone (PF) (DILAUDID) injection 0.5 mg  0.5 mg IntraVENous Q4H PRN    prochlorperazine (COMPAZINE) injection 10 mg  10 mg IntraVENous Q6H PRN    zolpidem (AMBIEN) tablet 5 mg  5 mg Oral QHS PRN    cholestyramine-aspartame (QUESTRAN LIGHT) packet 4 g  1 Packet Oral QHS    ferrous sulfate tablet 325 mg  325 mg Oral BID    famotidine (PEPCID) tablet 20 mg  20 mg Oral DAILY    montelukast (SINGULAIR) tablet 10 mg  10 mg Oral QHS    loperamide (IMODIUM) capsule 2 mg  2 mg Oral DAILY    Warfarin: pharmacy to dose    Other Rx Dosing/Monitoring    enoxaparin (LOVENOX) injection 60 mg  60 mg SubCUTAneous Q24H       Social History:  Social History   Substance Use Topics    Smoking status: Former Smoker     Packs/day: 2.00     Quit date: 11/8/1980    Smokeless tobacco: Never Used    Alcohol use 0.5 oz/week     1 Glasses of wine per week      Comment: glass with dinner nightly       Family History:  Family History   Problem Relation Age of Onset    Cancer Mother     Cancer Brother        Review of Systems:  Constitutional: negative fever, negative chills, negative weight loss  Eyes:   negative visual changes  ENT:   negative sore throat, tongue or lip swelling  Respiratory:  negative cough, negative dyspnea  Cards:  negative for chest pain, palpitations, lower extremity edema  GI:   See HPI  :  negative for frequency, dysuria  Integument:  negative for rash and pruritus  Heme:  negative for easy bruising and gum/nose bleeding  Musculoskel: negative for myalgias,  back pain and muscle weakness  Neuro: negative for headaches, dizziness, vertigo  Psych:  negative for feelings of anxiety, depression     Objective:   Patient Vitals for the past 8 hrs:   BP Temp Pulse Resp SpO2   10/15/18 1126 142/69 97.7 °F (36.5 °C) 64 18 94 %   10/15/18 0800 - - - - 98 %   10/15/18 0725 144/78 98.1 °F (36.7 °C) 62 16 94 %   10/15/18 0701 - - 81 - -     10/15 0701 - 10/15 1900  In: 450 [P.O.:450]  Out: 350 [Urine:350]  10/13 1901 - 10/15 0700  In: 500 [P.O.:500]  Out: 350 [Urine:350]    EXAM:     NEURO-alert, oriented x3, affect appropriate   HEENT-Head: Normocephalic, no lesions, without obvious abnormality. LUNGS-clear to auscultation bilaterally    COR-regular rate and rhythym     ABD- soft, non-tender.  Bowel sounds normal. No masses,  no organomegaly     EXT-no edema    Skin - No rash     Data Review     Recent Labs      10/15/18   0319  10/14/18   1437   WBC  9.0  7.4   HGB  10.5*  10.4*   HCT  33.8*  33.1*   PLT  319  297     Recent Labs      10/15/18   0319  10/14/18   1437   NA  139  141   K  4.9  4.0   CL  104  104   CO2  28  32   BUN  29*  29*   CREA  1.70*  1.79*   GLU  83  84   PHOS  3.2   --    CA  8.6  8.8     Recent Labs      10/14/18   1437   SGOT  24   AP  175*   TP  6.8   ALB  3.0*   GLOB  3.8     Recent Labs      10/15/18   0319  10/14/18   1437   INR  1.4*  1.3*   PTP  14.0*  13.4*   APTT   --   32.8*       Patient Active Problem List   Diagnosis Code    Colon cancer (HCC) C18.9    Hand weakness R29.898    Chronic atrial fibrillation (HCC) I48.2    EDWIN (obstructive sleep apnea) G47.33    Chronic anticoagulation Z79.01    Elevated troponin R74.8    Abnormal chest x-ray R93.89    CKD (chronic kidney disease) stage 3, GFR 30-59 ml/min (HCC) N18.3       Assessment and Plan:  Mr Lady Antonio was seen in our office on 9/28/18 for maroon stools in the setting of anticoagulation with Warfarin for A Fib. At that time, he was feeling run down and was noted to have maroon stools on rectal exam.  He was advised to hold Warfarin and Dr. Fannie Rodas, his cardiologist was notified. His hemoglobin was 9.0 on that day was 9.0. We had called to check on him the week after he was seen in the office and he explained he had to have 2 units blood transfused. Since stopping the warfarin, he denies further blood in his stools. He is at high risk for bleeding while on anticoagulation. Thanks for allowing me to participate in the care of this patient.   Signed By: Marquis Mcdowell NP     10/15/2018  2:25 PM

## 2018-10-15 NOTE — CONSULTS
[facility]: [id number] [address] Neurology  [place] [address]  [phone]        Name:   Kristian Hirsch   Medical record #: [id number]  Admission Date: 10/14/2018   Who Consulted: Dr. Ania Swain  Reason for Consult: Rule out TIA    HISTORY OF PRESENT ILLNESS   This is a 80 y.o. male with  has a past medical history of Arthritis; Cancer ([facility] [address] Cancer ([facility] [address] Cancer ([facility] [address] Chronic atrial fibrillation (HCC) (10/14/2018); GERD (gastroesophageal reflux disease); Ill-defined condition; Murmur; Other ill-defined conditions(799.89); and Unspecified sleep apnea. who is admitted for weakness . The Neurology Service is asked to evaluate for TIA versus stroke. Mr. Jd Sierra presented to the ED on 10/14/2018 with an one hour history of sudden onset right hand numbness and weakness which resolved by the time he arrived in the ED. Upon arrival teleneurology was consulted who recommended stroke work up. He is normally on warfarin for afib but was taked off approximately 1.5 weeks ago due to GI bleeding. Presenting NIH stroke score:  1    Clinical Data  Imaging review:   CT Head:  No acute process  Current rhythm:  Afib    Assessment/Plan:   1. Transient Ischemic Attack, r/o Stroke:    · Will discuss antiplatelets with  · Will need ASA at discharge  · Neurochecks:  Every 4 hours  · Blood Sugar Goal:  140-180  · BP Goal: Less than 180/105 for 24 hours  · Telemetry for at least 24 hours    Stroke work up  · A1C:  · LDL:  26.8  · TTE:    · Follow up MRI:  · Carotid vascular imaging:    Risk factors for stroke include:  Afib, Cancer, CAD, physical inactivity, EDWIN  · Discussed with patient    · Discussed signs/symptoms of stroke and when to call 911    3. Mobility:   · Has been/not been OOB. · PT/OT to eval for rehab    4. Diet:    · Does not need SLP, passed STAND    5.   VTE Prophylaxes:   · Lovenox 40 mg, SQ daily      Thank you for allowing the Neurology Service the pleasure of participating in the care of your patient. This patient will be discussed with my collaborating care team physician Dr. Urvashi Miller and he may have further recommendations regarding this patient's care. Attending Attestation:     I have reviewed the documentation provided by the nurse practitioner, Mrs. Michael Hickman, and we have discussed her findings and the clinical impression. I have formulated with her the proposed management plans for this patient. Additionally,  I have personally evaluated the patient to verify the history and to confirm physical findings. Below are my additional comments:  Pleasant 80year old gentleman who was in BR when he had abrupt onset of inability to use his right hand. He could not fasten his pants. He was unable to close the hand. No pain. No inciting factor. HE told his wife and they called 911. By the time EMS arrived he could move fingers some and by the time he was here in ER, he was 80% better and then finally resolved. No Arm sxs--he could raise and move the right UE. No leg sxs. No speech/language abnormalities. Never had before. HAd GI bld x 2 in setting of diarrhea and eval failed to demonstrate source--? Rectal he notes. Off coumadin due to this. INR was 1.3 in ER. Non smoker. Compliant with meds. LDL this am 27. No CP, palp, SOB, HA or other sxs. Exam  Visit Vitals    /78 (BP 1 Location: Left arm, BP Patient Position: At rest)    Pulse 62    Temp 98.1 °F (36.7 °C)    Resp 16    Ht 5' 10\" (1.778 m)    Wt 64.5 kg (142 lb 4.8 oz)    SpO2 98%    BMI 20.42 kg/m2     Pleasant, well appearing gentleman reading the paper in bed. No icterus. Oropharynx clear. Supple neck without bruit. Pulses symmetric. No edema legs. Bruises on extrems. Thin gentleman. Neurologically, he is awake, alert, oriented with normal speech, language, and cognition. Visual fields are full to direct confrontational testing. He has sharp disk margins bilaterally.  He has full versions without nystagmus. Face is symmetrical with symmetrical facial sensation. Tongue and palate are midline. Shoulder shrug is symmetrical. Hearing is intact to conversation. He has normal bulk and tone for age with age related paratonia throughout. There is no abnormal movement. There is no pronation or drift. He is able to generate full strength in the upper and lower extremities and all muscle groups to direct confrontational testing. Reflexes are globally hyporeflexic but symmetrical in the upper and lower extremities bilaterally. His toes are mute. There is no Rivas. Finger nose finger and rapid alternating movements are normal. He has no ataxia or past pointing. Sensory examination is intact to primary modalities and there is no lateralization of sensation. No Romberg is present. There is no extinction. Likely TIA left hemispheric and although uncommon to just have hand sxs, the homunculus can explain  Agree with anticoag if safe from GI/CArds standpoint--pharmacy has begun dosing  Cont statin  Repeat CT head as he was unable to get flat enough for MRI due to kyphosis  Await remainder of eval  EEG to be complete given current recs  Therapies to see  Stroke teaching  Team to follow up    Thanks    Teo Woody MD                     Review of Systems: 10 point ROS was performed. Pertinent positives listed in HPI. Negative ROS is as follows. Pt denies: angina, palpitations, paresthesias, weakness, vision loss, slurred speech, aphasia, confusion, fever, chills, falls, headache, diplopia, back pain, neck pain, prior episodes of vertigo, hallucinations, new medications or dosage changes.     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 Past Surgical History:   Procedure Laterality Date    ABDOMEN SURGERY PROC UNLISTED      colon resection    COLONOSCOPY N/A 6/22/2016    COLONOSCOPY performed by [facility]ita [facility] at [facility]    HX ORTHOPAEDIC  2002    broken right leg -repair     HX OTHER SURGICAL      hemmrhoidectomy    HX TONSILLECTOMY       Family History   Problem Relation Age of Onset    Cancer Mother     Cancer Brother      Social History     Social History    Marital status:      Spouse name: N/A    Number of children: N/A    Years of education: N/A     Occupational History    Not on file. Social History Main Topics    Smoking status: Former Smoker     Packs/day: 2.00     Quit date: 11/8/1980    Smokeless tobacco: Never Used    Alcohol use 0.5 oz/week     1 Glasses of wine per week      Comment: glass with dinner nightly    Drug use: No    Sexual activity: Not on file     Other Topics Concern    Not on file     Social History Narrative       Allergies   No Known Allergies    Outpatient Meds  No current facility-administered medications on file prior to encounter. Current Outpatient Prescriptions on File Prior to Encounter   Medication Sig Dispense Refill    furosemide (LASIX) 40 mg tablet Take 80 mg by mouth daily.       B.infantis-B.ani-B.long-B.bifi (PROBIOTIC 4X) 10-15 mg TbEC Take 1 Cap by mouth daily.  vitamin e (E GEMS) 1,000 unit capsule Take 1,000 Units by mouth daily.  pyridoxine, vitamin B6, (VITAMIN B-6) 100 mg tablet Take 100 mg by mouth daily.  montelukast (SINGULAIR) 10 mg tablet Take 10 mg by mouth nightly.          Inpatient Meds    Current Facility-Administered Medications:     labetalol (NORMODYNE;TRANDATE) injection 10 mg, 10 mg, IntraVENous, Q4H PRN, Audie Addison MD    sodium chloride (NS) flush 5-10 mL, 5-10 mL, IntraVENous, Q8H, Audie Addison MD, 10 mL at 10/15/18 0600    sodium chloride (NS) flush 5-10 mL, 5-10 mL, IntraVENous, PRN, Audie Addison MD    acetaminophen (TYLENOL) tablet 650 mg, 650 mg, Oral, Q4H PRN, Audie Addison MD    oxyCODONE-acetaminophen (PERCOCET) 5-325 mg per tablet 1 Tab, 1 Tab, Oral, Q4H PRN, Audie Addison MD    HYDROmorphone (PF) (DILAUDID) injection 0.5 mg, 0.5 mg, IntraVENous, Q4H PRN, Audie Addison MD    prochlorperazine (COMPAZINE) injection 10 mg, 10 mg, IntraVENous, Q6H PRN, Audie Addison MD    zolpidem (AMBIEN) tablet 5 mg, 5 mg, Oral, QHS PRN, Audie Addison MD    cholestyramine-aspartame (Celeste Foot) packet 4 g, 1 Packet, Oral, QHS, Audie Addison MD, 4 g at 10/14/18 2130    ferrous sulfate tablet 325 mg, 325 mg, Oral, BID, Audie Addison MD, 325 mg at 10/14/18 1846    famotidine (PEPCID) tablet 20 mg, 20 mg, Oral, DAILY, Audie Addison MD    montelukast (SINGULAIR) tablet 10 mg, 10 mg, Oral, QHS, Audie Addison MD, 10 mg at 10/14/18 2130    loperamide (IMODIUM) capsule 2 mg, 2 mg, Oral, DAILY, Audie Addison MD    Warfarin: pharmacy to dose , , Other, Rx Dosing/Monitoring, Audie Addison MD    enoxaparin (LOVENOX) injection 60 mg, 60 mg, SubCUTAneous, Q24H, Audie Addison MD, 60 mg at 10/14/18 5821    Recent Results (from the past 24 hour(s))   EKG, 12 LEAD, INITIAL    Collection Time: 10/14/18  2:32 PM   Result Value Ref Range    Ventricular Rate 59 BPM    Atrial Rate 54 BPM    QRS Duration 160 ms    Q-T Interval 480 ms    QTC Calculation (Bezet) 475 ms    Calculated R Axis 46 degrees    Calculated T Axis -3 degrees    Diagnosis       Atrial fibrillation with slow ventricular response  Right bundle branch block  Abnormal ECG  When compared with ECG of 08-NOV-2012 12:35,  Nonspecific T wave abnormality has replaced inverted T waves in Inferior   leads     CBC WITH AUTOMATED DIFF    Collection Time: 10/14/18  2:37 PM   Result Value Ref Range    WBC 7.4 4.1 - 11.1 K/uL    RBC 3.15 (L) 4.10 - 5.70 M/uL    HGB 10.4 (L) 12.1 - 17.0 g/dL    HCT 33.1 (L) 36.6 - 50.3 %    .1 (H) 80.0 - 99.0 FL    MCH 33.0 26.0 - 34.0 PG    MCHC 31.4 30.0 - 36.5 g/dL    RDW 17.0 (H) 11.5 - 14.5 %    PLATELET 666 763 - 208 K/uL    MPV 9.4 8.9 - 12.9 FL    NRBC 2.4 (H) 0  WBC    ABSOLUTE NRBC 0.18 (H) 0.00 - 0.01 K/uL    NEUTROPHILS 51 32 - 75 %    LYMPHOCYTES 25 12 - 49 %    MONOCYTES 15 (H) 5 - 13 %    EOSINOPHILS 7 0 - 7 %    BASOPHILS 1 0 - 1 %    IMMATURE GRANULOCYTES 1 (H) 0.0 - 0.5 %    ABS. NEUTROPHILS 3.7 1.8 - 8.0 K/UL    ABS. LYMPHOCYTES 1.9 0.8 - 3.5 K/UL    ABS. MONOCYTES 1.1 (H) 0.0 - 1.0 K/UL    ABS. EOSINOPHILS 0.5 (H) 0.0 - 0.4 K/UL    ABS. BASOPHILS 0.1 0.0 - 0.1 K/UL    ABS. IMM.  GRANS. 0.1 (H) 0.00 - 0.04 K/UL    DF AUTOMATED      RBC COMMENTS ANISOCYTOSIS  1+        RBC COMMENTS MACROCYTOSIS  1+        RBC COMMENTS POIKILOCYTOSIS  1+        RBC COMMENTS SCHISTOCYTES  1+        RBC COMMENTS TARGET CELLS  1+       METABOLIC PANEL, COMPREHENSIVE    Collection Time: 10/14/18  2:37 PM   Result Value Ref Range    Sodium 141 136 - 145 mmol/L    Potassium 4.0 3.5 - 5.1 mmol/L    Chloride 104 97 - 108 mmol/L    CO2 32 21 - 32 mmol/L    Anion gap 5 5 - 15 mmol/L    Glucose 84 65 - 100 mg/dL    BUN 29 (H) 6 - 20 MG/DL    Creatinine 1.79 (H) 0.70 - 1.30 MG/DL    BUN/Creatinine ratio 16 12 - 20      GFR est AA 43 (L) >60 ml/min/1.73m2    GFR est non-AA 36 (L) >60 ml/min/1.73m2    Calcium 8.8 8.5 - 10.1 MG/DL    Bilirubin, total 0.8 0.2 - 1.0 MG/DL    ALT (SGPT) 25 12 - 78 U/L    AST (SGOT) 24 15 - 37 U/L    Alk.  phosphatase 175 (H) 45 - 117 U/L    Protein, total 6.8 6.4 - 8.2 g/dL    Albumin 3.0 (L) 3.5 - 5.0 g/dL    Globulin 3.8 2.0 - 4.0 g/dL    A-G Ratio 0.8 (L) 1.1 - 2.2     PROTHROMBIN TIME + INR    Collection Time: 10/14/18  2:37 PM   Result Value Ref Range    INR 1.3 (H) 0.9 - 1.1      Prothrombin time 13.4 (H) 9.0 - 11.1 sec   PTT    Collection Time: 10/14/18  2:37 PM   Result Value Ref Range    aPTT 32.8 (H) 22.1 - 32.0 sec    aPTT, therapeutic range     58.0 - 77.0 SECS   TROPONIN I    Collection Time: 10/14/18  2:37 PM   Result Value Ref Range    Troponin-I, Qt. 0.09 (H) <0.05 ng/mL   NT-PRO BNP    Collection Time: 10/14/18  2:37 PM   Result Value Ref Range    NT pro-BNP 99400 (H) 0 - 450 PG/ML   TROPONIN I    Collection Time: 10/14/18  8:38 PM   Result Value Ref Range    Troponin-I, Qt. 0.09 (H) <0.05 ng/mL   PROTHROMBIN TIME + INR    Collection Time: 10/15/18  3:19 AM   Result Value Ref Range    INR 1.4 (H) 0.9 - 1.1      Prothrombin time 14.0 (H) 9.0 - 11.1 sec   TROPONIN I    Collection Time: 10/15/18  3:19 AM   Result Value Ref Range    Troponin-I, Qt. 0.09 (H) <0.05 ng/mL   CBC W/O DIFF    Collection Time: 10/15/18  3:19 AM   Result Value Ref Range    WBC 9.0 4.1 - 11.1 K/uL    RBC 3.25 (L) 4.10 - 5.70 M/uL    HGB 10.5 (L) 12.1 - 17.0 g/dL    HCT 33.8 (L) 36.6 - 50.3 %    .0 (H) 80.0 - 99.0 FL    MCH 32.3 26.0 - 34.0 PG    MCHC 31.1 30.0 - 36.5 g/dL    RDW 16.9 (H) 11.5 - 14.5 %    PLATELET 719 579 - 805 K/uL    MPV 10.2 8.9 - 12.9 FL    NRBC 1.7 (H) 0  WBC    ABSOLUTE NRBC 0.15 (H) 0.00 - 2.98 K/uL   METABOLIC PANEL, BASIC    Collection Time: 10/15/18  3:19 AM   Result Value Ref Range    Sodium 139 136 - 145 mmol/L    Potassium 4.9 3.5 - 5.1 mmol/L    Chloride 104 97 - 108 mmol/L    CO2 28 21 - 32 mmol/L    Anion gap 7 5 - 15 mmol/L    Glucose 83 65 - 100 mg/dL    BUN 29 (H) 6 - 20 MG/DL    Creatinine 1.70 (H) 0.70 - 1.30 MG/DL    BUN/Creatinine ratio 17 12 - 20      GFR est AA 46 (L) >60 ml/min/1.73m2    GFR est non-AA 38 (L) >60 ml/min/1.73m2    Calcium 8.6 8.5 - 10.1 MG/DL   MAGNESIUM    Collection Time: 10/15/18  3:19 AM   Result Value Ref Range    Magnesium 2.1 1.6 - 2.4 mg/dL   PHOSPHORUS    Collection Time: 10/15/18  3:19 AM   Result Value Ref Range    Phosphorus 3.2 2.6 - 4.7 MG/DL       Care Plan discussed with:  Patient x   Family    RN    Care Manager    Consultant/Specialist:       Awa Giordano, ACNP-BC

## 2018-10-15 NOTE — CONSULTS
Tasha Choudhary MD, [address] [place] [address]  Camacho Hernandez 33  [phone]    Date of  Admission: 10/14/2018  2:05 PM         IMPRESSION and RECOMMENDATIONS     1. AF:  Chronic. CHADS-VASc was 3, but now after TIA, 5. Agree with resuming coumadin as risks of CVA likely outweigh risks of bleeding. If he has difficulty maintaining INR within therapeutic range (which apparently has not been a problem in the past), could change coumadin to eliquis 2.5mg bid. Intrinsically rate-controlled. 2.  AS:  Severe on echo, though seemingly asymptomatic at this time. No treatment. 3.  Abnormal troponin:  No cardiac symptoms. No further eval warranted at this time. I have discussed this plan with the patient. He appears to understand this plan and wishes to proceed ahead. Problem List  Date Reviewed: 10/15/2018          Codes Class Noted    * (Principal)Hand weakness ICD-10-CM: R29.898  ICD-9-CM: 728.87  10/14/2018        Chronic atrial fibrillation (HCC) (Chronic) ICD-10-CM: I48.2  ICD-9-CM: 427.31  10/14/2018        EDWIN (obstructive sleep apnea) (Chronic) ICD-10-CM: G47.33  ICD-9-CM: 327.23  10/14/2018        Chronic anticoagulation ICD-10-CM: Z79.01  ICD-9-CM: V58.61  10/14/2018        Elevated troponin ICD-10-CM: R74.8  ICD-9-CM: 790.6  10/14/2018        Abnormal chest x-ray ICD-10-CM: R93.89  ICD-9-CM: 793.2  10/14/2018        CKD (chronic kidney disease) stage 3, GFR 30-59 ml/min (HCC) (Chronic) ICD-10-CM: N18.3  ICD-9-CM: 585.3  10/14/2018        Colon cancer ([address] (Chronic) ICD-10-CM: C18.9  ICD-9-CM: 153.9  11/15/2012              History of Present Illness:     Eliseo Morales is a 80 y.o. male with the above problem list who was admitted for Hand weakness. Pt presented with right hand weakness and numbness. After 20 minutes, he become concerned and came to ER. Symptoms resolved by the time he arrived.   Now feels at baseline. Coumadin had been stopped recently due to GI bleeding which has resolved. Has had GI eval regarding this. He denies chest pain/discomfort, shortness of breath, dyspnea on exertion, orthopnea, paroxysmal noctural dyspnea, lower extremity edema, palpitations, syncope, or near-syncope. Current Facility-Administered Medications   Medication Dose Route Frequency    influenza vaccine 2018-19 (6 mos+)(PF) (FLUARIX QUAD/FLULAVAL QUAD) injection 0.5 mL  0.5 mL IntraMUSCular PRIOR TO DISCHARGE    furosemide (LASIX) tablet 80 mg  80 mg Oral DAILY    warfarin (COUMADIN) tablet 3 mg  3 mg Oral ONCE    labetalol (NORMODYNE;TRANDATE) injection 10 mg  10 mg IntraVENous Q4H PRN    sodium chloride (NS) flush 5-10 mL  5-10 mL IntraVENous Q8H    sodium chloride (NS) flush 5-10 mL  5-10 mL IntraVENous PRN    acetaminophen (TYLENOL) tablet 650 mg  650 mg Oral Q4H PRN    oxyCODONE-acetaminophen (PERCOCET) 5-325 mg per tablet 1 Tab  1 Tab Oral Q4H PRN    HYDROmorphone (PF) (DILAUDID) injection 0.5 mg  0.5 mg IntraVENous Q4H PRN    prochlorperazine (COMPAZINE) injection 10 mg  10 mg IntraVENous Q6H PRN    zolpidem (AMBIEN) tablet 5 mg  5 mg Oral QHS PRN    cholestyramine-aspartame (QUESTRAN LIGHT) packet 4 g  1 Packet Oral QHS    ferrous sulfate tablet 325 mg  325 mg Oral BID    famotidine (PEPCID) tablet 20 mg  20 mg Oral DAILY    montelukast (SINGULAIR) tablet 10 mg  10 mg Oral QHS    loperamide (IMODIUM) capsule 2 mg  2 mg Oral DAILY    Warfarin: pharmacy to dose    Other Rx Dosing/Monitoring    enoxaparin (LOVENOX) injection 60 mg  60 mg SubCUTAneous Q24H      No Known Allergies   Family History   Problem Relation Age of Onset    Cancer Mother     Cancer Brother       Social History     Social History    Marital status:      Spouse name: N/A    Number of children: N/A    Years of education: N/A     Occupational History    Not on file.      Social History Main Topics    Smoking status: Former Smoker     Packs/day: 2.00     Quit date: 11/8/1980    Smokeless tobacco: Never Used    Alcohol use 0.5 oz/week     1 Glasses of wine per week      Comment: glass with dinner nightly    Drug use: No    Sexual activity: Not on file     Other Topics Concern    Not on file     Social History Narrative       Physical Exam:     Patient Vitals for the past 16 hrs:   BP Temp Pulse Resp SpO2 Weight   10/15/18 1126 142/69 97.7 °F (36.5 °C) 64 18 94 % -   10/15/18 0800 - - - - 98 % -   10/15/18 0725 144/78 98.1 °F (36.7 °C) 62 16 94 % -   10/15/18 0701 - - 81 - - -   10/15/18 0311 137/72 97.9 °F (36.6 °C) 62 16 91 % 64.5 kg (142 lb 4.8 oz)   10/14/18 2331 142/71 98.2 °F (36.8 °C) 67 16 92 % -   10/14/18 2300 - - 65 - - -       HEENT Exam:     Normocephalic, atraumatic. EOMI. Oropharynx negative. Neck supple. No lymphadenopathy. Lung Exam:     The patient is not dyspneic. There is no cough. Breath sounds are heard equally in all lung fields. There are no wheezes, rales, rhonchi, or rubs heard on auscultation. Heart Exam:     The rhythm is irregularly irregular. The PMI is in the 5th intercostal space of the MCL. Apical impulse is normal. S1 is regular. S2 is diminished. There is no S3, S4 gallop, click, or rub. 2/6 SM primarily along LSB with radiation to apex. Abdomen Exam:     Bowel sounds are normoactive. Abdomen soft in all quadrants. No tenderness. No palpable masses. No organomegaly. No hernias noted. No bruits or pulsatile mass. Extremities Exam:     The extremities are atraumatic appearing. There is no clubbing, cyanosis, edema, ulcers, varicose veins, rash, erythemia noted in the extremities. The neurovascular status is grossly intact with normal distal sensation and pulses. Vascular Exam:     The radial, brachial, dorsalis pedis, posterior tibial, are equal and strong bilaterally The carotids are equal bilaterally without bruits.           Labs:     Lab Results Component Value Date/Time    Glucose 83 10/15/2018 03:19 AM    Sodium 139 10/15/2018 03:19 AM    Potassium 4.9 10/15/2018 03:19 AM    Chloride 104 10/15/2018 03:19 AM    CO2 28 10/15/2018 03:19 AM    BUN 29 (H) 10/15/2018 03:19 AM    Creatinine 1.70 (H) 10/15/2018 03:19 AM    Calcium 8.6 10/15/2018 03:19 AM     Recent Labs      10/15/18   0319  10/14/18   1437   WBC  9.0  7.4   HGB  10.5*  10.4*   HCT  33.8*  33.1*   PLT  319  297     Recent Labs      10/14/18   1437   SGOT  24   ALT  25   AP  175*   TBILI  0.8   TP  6.8   ALB  3.0*   GLOB  3.8     Recent Labs      10/15/18   0319  10/14/18   1437   INR  1.4*  1.3*   PTP  14.0*  13.4*   APTT   --   32.8*      No results for input(s): CPK, CKMB, TNIPOC in the last 72 hours. No lab exists for component: TROPONINI, ITNL  Recent Labs      10/15/18   0319   TROIQ  0.09*     Lab Results   Component Value Date/Time    Cholesterol, total 107 10/15/2018 03:19 AM    HDL Cholesterol 71 10/15/2018 03:19 AM    LDL, calculated 26.8 10/15/2018 03:19 AM    Triglyceride 46 10/15/2018 03:19 AM    CHOL/HDL Ratio 1.5 10/15/2018 03:19 AM       EKG:  AF @ 59, RBBB. Echo:  SUMMARY:  Left ventricle: Ejection fraction was estimated in the range of 55 % to 60  %. Wall thickness was moderately to markedly increased. Right ventricle: The ventricle was dilated. Systolic function was mildly  to moderately reduced. Left atrium: The atrium was severely dilated. Right atrium: The atrium was moderately to severely dilated. Mitral valve: There was mild to moderate annular calcification. There was  mild regurgitation. Aortic valve: Leaflets exhibited mild to moderate calcification and  reduced mobility. There was severe aortic stenosis. Tricuspid valve: There was moderate regurgitation. Pulmonic valve: There was moderate regurgitation. Pericardium: There was a left pleural effusion.

## 2018-10-15 NOTE — PHYSICIAN ADVISORY
Short Stay Review Pt Name:  Mary Rivera MR#  [id number] CSN#   [id number] Room and Hospital  316/01  @ [facility] Hospitalization date  10/14/2018  2:05 PM 
No discharge date for patient encounter. Current Attending Physician  Shiraz Grimes MD  
 
A discharge order has been placed for this episode of hospital care for Mr. Mary Rivera; since this hospital stay is less than two midnights, I reviewed Mr. Alexx Meng chart. Mr. Alexx Meng healthcare insurance/benefit include: 
Payor: [place] MED[place]ARE / Plan: VA MEDICARE PART A & B / Product Type: Medicare /  
 
Utilization Review related case summary:  
Age  80 y.o.  
BMI  Body mass index is 20.42 kg/(m^2). PMHx includes  Chronic Afib, Colon CA, EDWIN, CKD Hospital course  The pt was hospitalized with hand weakness and workup seems to be complete and diagnosis of TIA is reported. Risk of deterioration at the time this patient  was hospitalized     Moderate On the basis of chart review, this patient's hospitalization status Should be changed to OBSERVATION Yuko Birmingham MD MPH FACP Cell : [phone] Physician Advisor [place] [phone] [facility] [address] [facility]  
Utilization Review, Care Management CSN:  [id number] PATRIZIA:   [id number] Admitted on :  10/14/2018 Discharge order

## 2018-10-15 NOTE — PROGRESS NOTES
Hospital PCP MARIO ALBERTO MENDOSA f/u appointment on Tuesday October 16,2018 @ 11:30 a.m. with Dr. Nancyann Lefort. Added to AVS. Deangelo Williamson CM Specialist

## 2018-10-15 NOTE — PROGRESS NOTES
Occupational Therapy EVALUATION/discharge Patient: Lelo Garcia (89 y.o. male) Date: 10/15/2018 Primary Diagnosis: Hand weakness Precautions: universal    
 
ASSESSMENT:  
Based on the objective data described below, the patient presents with good overall activity tolerance following admission on 10/14 for R hand weakness. 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 Patient with intact B UE strength and coordination; He reports R hand weakness resolved on admission. Patient was independent/mod I level for completion of all ADLs and functional mobility. Patient was educated on energy conservation and pacing techniques as well as general home safety. Patient is at his baseline in regards to functional abilities and has no further skilled OT needs at this time. Discharge Recommendations: none Further Equipment Recommendations for Discharge: none SUBJECTIVE:  
Patient stated I am feeling so much better now.  OBJECTIVE DATA SUMMARY:  
HISTORY:  
Past Medical History:  
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   
 colon  Chronic atrial fibrillation ([facility] [address] 10/14/2018  GERD (gastroesophageal reflux disease)  Ill-defined condition   
 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   
 doesn't use c-pap. Past Surgical History:  
Procedure Laterality Date  ABDOMEN SURGERY PROC UNLISTED    
 colon resection  COLONOSCOPY N/A 6/22/2016 COL[address]COPY performed by Cookie Mcdermott MD at [address]  HX ORTHOPAEDIC  2002  
 broken right leg -repair  HX OTHER SURGICAL    
 hemmrhoidectomy  HX TONSILLECTOMY Prior Level of Function/Environment/Context: Patient lives with his wife. He reports independence with ADLs and functional mobility PTA. Home Situation Home Environment: Private residence # Steps to Enter: 6 One/Two Story Residence: Two story # of Interior Steps: 12 Height of Each Step (in): 1 inches Interior Rails: Both Lift Chair Available: No 
Living Alone: No 
Support Systems: Child(nida), [employer] [facility] community, Family member(s) Patient Expects to be Discharged to[de-identified] Private residence Current DME Used/Available at Home: None Tub or Shower Type: Shower Hand dominance: Right EXAMINATION OF PERFORMANCE DEFICITS: 
Cognitive/Behavioral Status: 
Neurologic State: Alert Orientation Level: Oriented X4 Cognition: Appropriate decision making; Appropriate for age attention/concentration; Appropriate safety awareness Perception: Appears intact Perseveration: No perseveration noted Safety/Judgement: Awareness of environment;Good awareness of safety precautions Skin: Intact in the uppers Edema: None noted in the uppers Hearing: Auditory Auditory Impairment: None Vision/Perceptual:   
Tracking: Able to track stimulus in all quadrants w/o difficulty Diplopia: No   
Acuity: Within Defined Limits Corrective Lenses: Glasses Range of Motion: 
AROM: Within functional limits in the uppers PROM: Within functional limits in the uppers Strength: 
Strength: Within functional limits in the uppers Coordination: 
Coordination: Within functional limits Tone & Sensation: 
Tone: Normal 
Sensation: Intact in the uppers Balance: 
Sitting: Intact Standing: Intact; With support Functional Mobility and Transfers for ADLs:Bed Mobility: 
Rolling: Independent Supine to Sit: Independent Sit to Supine: Independent Scooting: Independent Transfers: 
Sit to Stand: Modified independent Stand to Sit: Modified independent Bed to Chair: Modified independent Bathroom Mobility: Modified independent ADL Assessment: 
Feeding: Independent Oral Facial Hygiene/Grooming: Independent Bathing: Modified independent Upper Body Dressing: Independent Lower Body Dressing: Modified independent Toileting: Modified independent Patient instructed and indicated understanding energy conservation techniques to increase independence and safety during all ADLs for end goal of returning home. Patient encouraged to use energy conservation techniques during ADLs to increase participation in life activities patient prefers, to ensure more frequent good days. If having a bad day, evaluate tasks completed day before and re-plan how to save energy to complete same task. Patient confirmed understanding of energy conservation techniques and demonstrated understanding during activity. Cognitive Retraining Safety/Judgement: Awareness of environment;Good awareness of safety precautions Functional Measure: 
Barthel Index: 
 
Bathin Bladder: 10 Bowels: 10 
Groomin Dressing: 10 Feeding: 10 Mobility: 10 Stairs: 0 Toilet Use: 10 Transfer (Bed to Chair and Back): 10 Total: 80 Barthel and G-code impairment scale: 
Percentage of impairment CH 
0% CI 
1-19% CJ 
20-39% CK 
40-59% CL 
60-79% CM 
80-99% CN 
100% Barthel Score 0-100 100 99-80 79-60 59-40 20-39 1-19 
 0 Barthel Score 0-20 20 17-19 13-16 9-12 5-8 1-4 0 The Barthel ADL Index: Guidelines 1. The index should be used as a record of what a patient does, not as a record of what a patient could do. 2. The main aim is to establish degree of independence from any help, physical or verbal, however minor and for whatever reason. 3. The need for supervision renders the patient not independent. 4. A patient's performance should be established using the best available evidence. Asking the patient, friends/relatives and nurses are the usual sources, but direct observation and common sense are also important. However direct testing is not needed. 5. Usually the patient's performance over the preceding 24-48 hours is important, but occasionally longer periods will be relevant. 6. Middle categories imply that the patient supplies over 50 per cent of the effort. 7. Use of aids to be independent is allowed. [employer] [place]., Barthel, D.W. ([id number]). Functional evaluation: the Barthel Index. [address] [facility] (14)2. Mignon Carbajal tanesha ROQUE Rueda, Emi Elizalde., Lakeisha Hunt., [place], [address] (1999). Measuring the change indisability after inpatient rehabilitation; comparison of the responsiveness of the Barthel Index and Functional Ward Measure. Journal of Neurology, Neurosurgery, and Psychiatry, 66(4), 931-150. CARLOTTA Guzman, JYOTI Ordonez, & Kacey Webb M.A. (2004.) Assessment of post-stroke quality of life in cost-effectiveness studies: The usefulness of the Barthel Index and the EuroQoL-5D. [facility], 09, 816-08 G codes: In compliance with CMSs Claims Based Outcome Reporting, the following G-code set was chosen for this patient based on their primary functional limitation being treated: The outcome measure chosen to determine the severity of the functional limitation was the Barthel Index with a score of 80/100 which was correlated with the impairment scale. ? Self Care:  
  - CURRENT STATUS: CI - 1%-19% impaired, limited or restricted  - GOAL STATUS: CI - 1%-19% impaired, limited or restricted  - D/C STATUS:  CI - 1%-19% impaired, limited or restricted Occupational Therapy Evaluation Charge Determination History Examination Decision-Making LOW Complexity : Brief history review  LOW Complexity : 1-3 performance deficits relating to physical, cognitive , or psychosocial skils that result in activity limitations and / or participation restrictions  LOW Complexity : No comorbidities that affect functional and no verbal or physical assistance needed to complete eval tasks Based on the above components, the patient evaluation is determined to be of the following complexity level: LOW Pain: 
Pain Scale 1: Numeric (0 - 10) Pain Intensity 1: 0 Activity Tolerance:  
Patient tolerated eval well. After treatment:  
[x]  Patient left in no apparent distress sitting up in chair 
[]  Patient left in no apparent distress in bed 
[x]  Call bell left within reach [x]  Nursing notified 
[]  Caregiver present 
[]  Bed alarm activated COMMUNICATION/EDUCATION:  
Communication/Collaboration: 
[x]      Home safety education was provided and the patient/caregiver indicated understanding. [x]      Patient/family have participated as able and agree with findings and recommendations. []      Patient is unable to participate in plan of care at this time. Findings and recommendations were discussed with: Physical Therapist, Registered Nurse and patient. Beatrice Lopez OTR/L Time Calculation: 20 mins

## 2021-09-17 NOTE — PROGRESS NOTES
Echo completed report to follow. Tachycardia 130-200 on monitor dr eduardo on unit. New orders noted/meds resumed/initated/titrated as ordered

## 2025-01-07 NOTE — PROGRESS NOTES
10- Reason for Admission:   Hand Weakness RRAT Score:  12 Plan for utilizing home health:  No     
                 
Likelihood of Readmission:  Low Transition of Care Plan:  Home I met with the pt and his daughter, Coleen Davidson ([phone]), to determine potential discharge needs. The pt lives with his wife, Puma Ferguson, in a 2-story house with a first floor master bedroom and bath. He is independent with his ADL's, uses a cane mostly and a rollator for distances and drives. His PCP is Dr. Stanislav Lovett. He has prescription drug coverage and gets his medications from [facility] [address] or thru mail order. He does not anticipate any discharge needs. Care Management Interventions PCP Verified by CM: Yes (Dr. Niecy Martin nurse navigator) Discharge Durable Medical Equipment: No (Has a cane and rollator) Physical Therapy Consult: Yes Occupational Therapy Consult: Yes Current Support Network: Lives with Spouse, Own Home Confirm Follow Up Transport: Family Plan discussed with Pt/Family/Caregiver: Yes Discharge Location Discharge Placement:  (Home) ANIBAL Herrera, CM                   
 
 
 S/p SJM BiV ICD